# Patient Record
Sex: FEMALE | Race: WHITE | NOT HISPANIC OR LATINO | Employment: OTHER | URBAN - METROPOLITAN AREA
[De-identification: names, ages, dates, MRNs, and addresses within clinical notes are randomized per-mention and may not be internally consistent; named-entity substitution may affect disease eponyms.]

---

## 2017-07-19 ENCOUNTER — CONVERSION ENCOUNTER (OUTPATIENT)
Dept: GENERAL RADIOLOGY | Facility: HOSPITAL | Age: 47
End: 2017-07-19

## 2018-06-04 ENCOUNTER — OFFICE VISIT CONVERTED (OUTPATIENT)
Dept: NEUROSURGERY | Facility: CLINIC | Age: 48
End: 2018-06-04
Attending: PHYSICIAN ASSISTANT

## 2018-06-21 ENCOUNTER — OFFICE VISIT CONVERTED (OUTPATIENT)
Dept: NEUROLOGY | Facility: CLINIC | Age: 48
End: 2018-06-21
Attending: PSYCHIATRY & NEUROLOGY

## 2019-02-01 ENCOUNTER — HOSPITAL ENCOUNTER (OUTPATIENT)
Dept: PHYSICAL THERAPY | Facility: CLINIC | Age: 49
Discharge: HOME | End: 2019-02-01
Attending: EMERGENCY MEDICINE

## 2020-01-28 ENCOUNTER — HOSPITAL ENCOUNTER (OUTPATIENT)
Dept: GENERAL RADIOLOGY | Facility: HOSPITAL | Age: 50
Discharge: HOME | End: 2020-01-28
Attending: OBSTETRICS & GYNECOLOGY

## 2020-12-04 ENCOUNTER — TELEMEDICINE CONVERTED (OUTPATIENT)
Dept: FAMILY MEDICINE CLINIC | Facility: CLINIC | Age: 50
End: 2020-12-04
Attending: FAMILY MEDICINE

## 2020-12-10 ENCOUNTER — HOSPITAL ENCOUNTER (OUTPATIENT)
Dept: GENERAL RADIOLOGY | Facility: HOSPITAL | Age: 50
Discharge: HOME | End: 2020-12-10
Attending: FAMILY MEDICINE

## 2020-12-10 LAB
ALBUMIN SERPL-MCNC: 4 G/DL (ref 3.5–5)
ALBUMIN/GLOB SERPL: 1.3 {RATIO} (ref 1.4–2.6)
ALP SERPL-CCNC: 59 U/L (ref 42–98)
ALT SERPL-CCNC: 9 U/L (ref 10–40)
ANION GAP SERPL CALC-SCNC: 13 MMOL/L (ref 8–19)
AST SERPL-CCNC: 15 U/L (ref 15–50)
BASOPHILS # BLD AUTO: 0.02 10*3/UL (ref 0–0.2)
BASOPHILS NFR BLD AUTO: 0.4 % (ref 0–3)
BILIRUB SERPL-MCNC: 0.34 MG/DL (ref 0.2–1.3)
BUN SERPL-MCNC: 13 MG/DL (ref 5–25)
BUN/CREAT SERPL: 16 {RATIO} (ref 6–20)
CALCIUM SERPL-MCNC: 8.7 MG/DL (ref 8.7–10.4)
CHLORIDE SERPL-SCNC: 102 MMOL/L (ref 99–111)
CHOLEST SERPL-MCNC: 183 MG/DL (ref 107–200)
CHOLEST/HDLC SERPL: 2.5 {RATIO} (ref 3–6)
CONV ABS IMM GRAN: 0.01 10*3/UL (ref 0–0.2)
CONV CO2: 27 MMOL/L (ref 22–32)
CONV IMMATURE GRAN: 0.2 % (ref 0–1.8)
CONV TOTAL PROTEIN: 7 G/DL (ref 6.3–8.2)
CREAT UR-MCNC: 0.83 MG/DL (ref 0.5–0.9)
DEPRECATED RDW RBC AUTO: 37.9 FL (ref 36.4–46.3)
EOSINOPHIL # BLD AUTO: 0.06 10*3/UL (ref 0–0.7)
EOSINOPHIL # BLD AUTO: 1.2 % (ref 0–7)
ERYTHROCYTE [DISTWIDTH] IN BLOOD BY AUTOMATED COUNT: 11.8 % (ref 11.7–14.4)
FOLATE SERPL-MCNC: 8.6 NG/ML (ref 4.8–20)
GFR SERPLBLD BASED ON 1.73 SQ M-ARVRAT: >60 ML/MIN/{1.73_M2}
GLOBULIN UR ELPH-MCNC: 3 G/DL (ref 2–3.5)
GLUCOSE SERPL-MCNC: 86 MG/DL (ref 65–99)
HCT VFR BLD AUTO: 37.9 % (ref 37–47)
HDLC SERPL-MCNC: 74 MG/DL (ref 40–60)
HGB BLD-MCNC: 12.5 G/DL (ref 12–16)
LDLC SERPL CALC-MCNC: 87 MG/DL (ref 70–100)
LYMPHOCYTES # BLD AUTO: 1.48 10*3/UL (ref 1–5)
LYMPHOCYTES NFR BLD AUTO: 30.2 % (ref 20–45)
MCH RBC QN AUTO: 29.7 PG (ref 27–31)
MCHC RBC AUTO-ENTMCNC: 33 G/DL (ref 33–37)
MCV RBC AUTO: 90 FL (ref 81–99)
MONOCYTES # BLD AUTO: 0.32 10*3/UL (ref 0.2–1.2)
MONOCYTES NFR BLD AUTO: 6.5 % (ref 3–10)
NEUTROPHILS # BLD AUTO: 3.01 10*3/UL (ref 2–8)
NEUTROPHILS NFR BLD AUTO: 61.5 % (ref 30–85)
NRBC CBCN: 0 % (ref 0–0.7)
OSMOLALITY SERPL CALC.SUM OF ELEC: 285 MOSM/KG (ref 273–304)
PLATELET # BLD AUTO: 333 10*3/UL (ref 130–400)
PMV BLD AUTO: 11.4 FL (ref 9.4–12.3)
POTASSIUM SERPL-SCNC: 4.1 MMOL/L (ref 3.5–5.3)
RBC # BLD AUTO: 4.21 10*6/UL (ref 4.2–5.4)
SODIUM SERPL-SCNC: 138 MMOL/L (ref 135–147)
TRIGL SERPL-MCNC: 111 MG/DL (ref 40–150)
TSH SERPL-ACNC: 1.76 M[IU]/L (ref 0.27–4.2)
VIT B12 SERPL-MCNC: 419 PG/ML (ref 211–911)
VLDLC SERPL-MCNC: 22 MG/DL (ref 5–37)
WBC # BLD AUTO: 4.9 10*3/UL (ref 4.8–10.8)

## 2021-05-10 NOTE — H&P
History and Physical      Patient Name: Lucina Bustos   Patient ID: [id number]   Sex: Female   YOB: 1970    Primary Care Provider: Kaye NUNES   Referring Provider: Miladys Schreiber PA-C    Visit Date: December 4, 2020    Provider: Yunier Pryor DO   Location: [facility]   Location Address: [address] [place] [address]  [id number]   Location Phone: [phone]          Chief Complaint  · establish care, refills      History Of Present Illness  Video Conferencing Visit  Lucina Bustos is a 50 year old /White female who is presenting for evaluation via video conferencing via [employer]. Verbal consent obtained before beginning visit.   The following staff were present during this visit: Yunier Pryor DO   Lucina Bustos is a 50 year old /White female who presents for evaluation and treatment of:        Patient presents to establish care via telehealth to establish care has PMH negative for hypothyroidism and B12 deficiency recently scratched her eye and is going to follow-up with an eye doctor about that.    Otherwise doing well no concerns otherwise does need refills on her medicines and labs checked in the next few months or so       Past Medical History  Disease Name Date Onset Notes   Cervicalgia --  --    Hypothyroidism --  --    Lateral epicondylitis of right elbow 01/26/2017 --    Pain: Elbow --  --    Pain: Shoulder --  --          Past Surgical History  Procedure Name Date Notes   Colonoscopy --  David-2017   EGD --  David 2017   Hysterectomy --  --    Knee surgery --  --          Medication List  Name Date Started Instructions   cyanocobalamin (vitamin B-12) 1,000 mcg/mL injection solution 12/04/2020 inject 0.05 milliliter (50 mcg) by intramuscular route once a month for 365 days   ibuprofen 800 mg oral tablet  take 1 tablet by oral route 2 times a day   levothyroxine 100 mcg oral tablet 12/04/2020 take 1 tablet (100 mcg) by  oral route once daily for 90 days   NuLYTELY with Flavor Packs 420 gram oral recon soln 09/02/2020 take as directed   Premarin 0.625 mg oral tablet  --          Allergy List  Allergen Name Date Reaction Notes   NO KNOWN DRUG ALLERGIES --  --  --        Allergies Reconciled  Family Medical History  Disease Name Relative/Age Notes   Diabetes, unspecified type  --    Heart Attack (MI)  --    Family history of colon cancer Aunt/60   Mothers sister         Social History  Finding Status Start/Stop Quantity Notes   Smokeless tobacco Current every day 30/-- 1 can every 2d dip tobacco- 1 can last her 2 days   Tobacco Former 19/40 1ppd QUIT 12/5/2011         Review of Systems  · Constitutional  o Denies  o : fever, fatigue, weight loss, weight gain  · HENT  o Denies  o : sinus pain, nasal congestion, nasal discharge, postnasal drip  · Cardiovascular  o Denies  o : lower extremity edema, claudication, chest pressure, palpitations  · Respiratory  o Denies  o : shortness of breath, wheezing, cough, hemoptysis, dyspnea on exertion  · Gastrointestinal  o Denies  o : nausea, vomiting, diarrhea, constipation, abdominal pain  · Integument  o Denies  o : rash, itching  · Psychiatric  o Denies  o : anxiety, depression      Physical Examination  · Constitutional  o Appearance  o : no acute distress, well-nourished  · Head and Face  o Head  o :   § Inspection  § : atraumatic, normocephalic  · Ears, Nose, Mouth and Throat  o Ears  o : No pain, no loss of hearing, no tinnitus  o Nose  o : No discharge, no dryness, no bleeding and no obstruction, No discharge  · Respiratory  o Respiratory Effort  o : breathing comfortably, symmetric chest rise  · Skin and Subcutaneous Tissue  o General Inspection  o : no lesions present  · Psychiatric  o General  o : normal mood and affect          Assessment  · Hypothyroidism     244.9/E03.9  · B12 deficiency     266.2/E53.8         Hypothyroidism  *Control  Continue with Synthroid 100 mcg  Recheck in the  next 3 to 6 months    B12 deficiency  Continue with injection and sent home  We will recheck as well in the next 3 to 6 months    Follow-up annually for physical labs in the next 3 to 6 months sooner if concerns       Plan  · Orders  o Physical, Primary Care Panel (CBC, CMP, Lipid, TSH) [facility] ([id number], [id number], 80333, 85114) - 244.9/E03.9, 266.2/E53.8 - 12/04/2020  o ACO-39: Current medications updated and reviewed (, 1159F) - 244.9/E03.9, 266.2/E53.8 - 12/04/2020  o B12 Folate levels (B12FO) - 244.9/E03.9, 266.2/E53.8 - 12/04/2020  · Medications  o Medications have been Reconciled  · Instructions  o Patient was educated/instructed on their diagnosis, treatment and medications prior to discharge from the clinic today.  o Patient counseled to reduce calorie intake.  o Patient was instructed to exercise regularly.  o Patient instructed to seek medical attention urgently for new or worsening symptoms.  o Risks, benefits, and alternatives were discussed with the patient. The patient is aware of risks associated with:  o Chronic conditions reviewed and taken into consideration for today's treatment plan.  o Electronically Identified Patient Education Materials Provided Electronically  · Disposition  o Call or Return if symptoms worsen or persist.  o Labs before follow up ordered  o Follow up when due for next physical            Electronically Signed by: Yunier Pryor, DO -Author on December 4, 2020 09:53:53 AM

## 2021-05-16 VITALS — HEART RATE: 76 BPM | BODY MASS INDEX: 19.78 KG/M2 | HEIGHT: 67 IN | WEIGHT: 126 LBS

## 2021-05-22 ENCOUNTER — TRANSCRIBE ORDERS (OUTPATIENT)
Dept: ADMINISTRATIVE | Facility: HOSPITAL | Age: 51
End: 2021-05-22

## 2021-05-22 DIAGNOSIS — Z12.31 OTHER SCREENING MAMMOGRAM: Primary | ICD-10-CM

## 2021-07-02 ENCOUNTER — APPOINTMENT (OUTPATIENT)
Dept: MAMMOGRAPHY | Facility: HOSPITAL | Age: 51
End: 2021-07-02

## 2021-08-27 ENCOUNTER — OFFICE VISIT (OUTPATIENT)
Dept: FAMILY MEDICINE CLINIC | Facility: CLINIC | Age: 51
End: 2021-08-27

## 2021-08-27 VITALS
WEIGHT: 144.3 LBS | OXYGEN SATURATION: 96 % | TEMPERATURE: 98.6 F | BODY MASS INDEX: 22.65 KG/M2 | DIASTOLIC BLOOD PRESSURE: 76 MMHG | SYSTOLIC BLOOD PRESSURE: 123 MMHG | HEART RATE: 71 BPM | HEIGHT: 67 IN

## 2021-08-27 DIAGNOSIS — N95.1 PERIMENOPAUSAL SYMPTOMS: ICD-10-CM

## 2021-08-27 DIAGNOSIS — R53.82 CHRONIC FATIGUE: ICD-10-CM

## 2021-08-27 DIAGNOSIS — E53.8 B12 DEFICIENCY: Chronic | ICD-10-CM

## 2021-08-27 DIAGNOSIS — M54.2 NECK PAIN: ICD-10-CM

## 2021-08-27 DIAGNOSIS — D12.6 TUBULAR ADENOMA OF COLON: ICD-10-CM

## 2021-08-27 DIAGNOSIS — E03.9 HYPOTHYROIDISM, UNSPECIFIED TYPE: Primary | Chronic | ICD-10-CM

## 2021-08-27 PROCEDURE — 99213 OFFICE O/P EST LOW 20 MIN: CPT | Performed by: FAMILY MEDICINE

## 2021-08-27 RX ORDER — LEVOTHYROXINE SODIUM 0.1 MG/1
100 TABLET ORAL DAILY
COMMUNITY
Start: 2021-06-09 | End: 2021-09-09

## 2021-08-27 RX ORDER — CONJUGATED ESTROGENS 0.62 MG/1
0.62 TABLET, FILM COATED ORAL DAILY
COMMUNITY
Start: 2021-08-19 | End: 2021-10-18

## 2021-08-27 NOTE — PROGRESS NOTES
"Chief Complaint  Labs Only (b12/thyroid) and Fatigue    Subjective          Lucina Bustos presents to [facility]  History of Present Illness    Patient presents for follow up on chronic conditions, established care originally back 12/2020 via telehealth visit and needed labs rechecked and more formal follow up. Also complaining of chronic fatigue not improved by her thyroid medicine, she takes synthroid 100mcg daily, last checked 12/2020 and within normal limits, other labs CBC CMP lipids also great.     No recent illness, snoring weight gain stress out at home or work out of the norm. She also takes premarin for perimenopausal symptoms and helping, no depression.     She has some neck pain at times, no radiating signs or symptoms, could be tension will consider xray of neck if continues or worsens but advised stretching and therapy, prn aleve for relief.    Last mammogram 2019 or 2020 birads 2     Objective   Vital Signs:   /76   Pulse 71   Temp 98.6 °F (37 °C) (Temporal)   Ht 170.2 cm (67\")   Wt 65.5 kg (144 lb 4.8 oz)   SpO2 96%   BMI 22.60 kg/m²     Physical Exam  Vitals reviewed.   Constitutional:       Appearance: Normal appearance. She is well-developed.   HENT:      Head: Normocephalic and atraumatic.      Right Ear: External ear normal.      Left Ear: External ear normal.      Mouth/Throat:      Pharynx: No oropharyngeal exudate.   Eyes:      Conjunctiva/sclera: Conjunctivae normal.      Pupils: Pupils are equal, round, and reactive to light.   Cardiovascular:      Rate and Rhythm: Normal rate and regular rhythm.      Heart sounds: No murmur heard.   No friction rub. No gallop.    Pulmonary:      Effort: Pulmonary effort is normal.      Breath sounds: Normal breath sounds. No wheezing or rhonchi.   Abdominal:      General: Bowel sounds are normal. There is no distension.      Palpations: Abdomen is soft.      Tenderness: There is no abdominal tenderness. "   Skin:     General: Skin is warm and dry.   Neurological:      Mental Status: She is alert and oriented to person, place, and time.      Cranial Nerves: No cranial nerve deficit.   Psychiatric:         Mood and Affect: Mood and affect normal.         Behavior: Behavior normal.         Thought Content: Thought content normal.         Judgment: Judgment normal.        Result Review :   The following data was reviewed by: Yunier Pryor DO on 08/27/2021:  Common labs    Common Labsle 12/10/20   Glucose 86   BUN 13   Creatinine 0.83   Sodium 138   Potassium 4.1   Chloride 102   Calcium 8.7   Albumin 4.0   Total Bilirubin 0.34   Alkaline Phosphatase 59   AST (SGOT) 15   ALT (SGPT) 9 (A)   WBC 4.90   Hemoglobin 12.5   Hematocrit 37.9   Platelets 333   Total Cholesterol 183   Triglycerides 111   HDL Cholesterol 74 (A)   LDL Cholesterol  87   (A) Abnormal value       Comments are available for some flowsheets but are not being displayed.                     Assessment and Plan    Diagnoses and all orders for this visit:    1. Hypothyroidism, unspecified type (Primary)  -     TSH+Free T4; Future  -     Vitamin B12; Future    2. Chronic fatigue    3. Neck pain  -     TSH+Free T4; Future  -     Vitamin B12; Future    4. B12 deficiency  -     TSH+Free T4; Future  -     Vitamin B12; Future    5. Perimenopausal symptoms    6. Tubular adenoma of colon    Labs today to recheck TSH T4  long time treatment of hypothyroidism on synthroid 100mcg daily    Taking b12 daily as well recheck to see if needs to continue taking    Complaints of fatigue and neck pain, consider further workup if worsens no better, exercises, stress reduction advised     Cscope 2017 with tubular adenoma, repeat 3-5 years or per pathology report, mammogram repeat recoommended every 1-2 years, history of breast biopsy benign 2014      Follow Up   Return in about 6 months (around 2/27/2022), or if symptoms worsen or fail to improve, for Labs before, Annual  physical.  Patient was given instructions and counseling regarding her condition or for health maintenance advice. Please see specific information pulled into the AVS if appropriate.

## 2021-08-30 ENCOUNTER — LAB (OUTPATIENT)
Dept: LAB | Facility: HOSPITAL | Age: 51
End: 2021-08-30

## 2021-08-30 DIAGNOSIS — M54.2 NECK PAIN: ICD-10-CM

## 2021-08-30 DIAGNOSIS — E03.9 HYPOTHYROIDISM, UNSPECIFIED TYPE: Chronic | ICD-10-CM

## 2021-08-30 DIAGNOSIS — E53.8 B12 DEFICIENCY: Chronic | ICD-10-CM

## 2021-08-30 LAB
T4 FREE SERPL-MCNC: 1.52 NG/DL (ref 0.93–1.7)
TSH SERPL DL<=0.05 MIU/L-ACNC: 0.17 UIU/ML (ref 0.27–4.2)
VIT B12 BLD-MCNC: >2000 PG/ML (ref 211–946)

## 2021-08-30 PROCEDURE — 84439 ASSAY OF FREE THYROXINE: CPT

## 2021-08-30 PROCEDURE — 84443 ASSAY THYROID STIM HORMONE: CPT

## 2021-08-30 PROCEDURE — 36415 COLL VENOUS BLD VENIPUNCTURE: CPT

## 2021-08-30 PROCEDURE — 82607 VITAMIN B-12: CPT

## 2021-09-02 ENCOUNTER — TELEPHONE (OUTPATIENT)
Dept: FAMILY MEDICINE CLINIC | Facility: CLINIC | Age: 51
End: 2021-09-02

## 2021-09-02 NOTE — TELEPHONE ENCOUNTER
Caller: Lucina Bustos    Relationship: Self    Best call back number: [phone]    What test was performed: BLOOD DRAW, B12 AND THYROID     When was the test performed: 8/30/21    Where was the test performed: IN OFFICE    Additional notes:   REQUESTING INFORMATION ABOUT ANY NEED FOR MEDICATION CHANGE

## 2021-09-09 RX ORDER — LEVOTHYROXINE SODIUM 0.07 MG/1
75 TABLET ORAL DAILY
Qty: 30 TABLET | Refills: 5 | Status: SHIPPED | OUTPATIENT
Start: 2021-09-09 | End: 2022-03-07

## 2021-09-24 PROBLEM — R53.82 CHRONIC FATIGUE: Status: ACTIVE | Noted: 2021-09-24

## 2021-09-24 PROBLEM — E03.9 HYPOTHYROIDISM: Chronic | Status: ACTIVE | Noted: 2021-08-27

## 2021-09-24 PROBLEM — E53.8 B12 DEFICIENCY: Chronic | Status: ACTIVE | Noted: 2021-08-27

## 2021-09-24 PROBLEM — N95.1 PERIMENOPAUSAL SYMPTOMS: Status: ACTIVE | Noted: 2021-09-24

## 2021-09-24 PROBLEM — D12.6 TUBULAR ADENOMA OF COLON: Status: ACTIVE | Noted: 2017-05-01

## 2021-10-07 ENCOUNTER — TELEPHONE (OUTPATIENT)
Dept: OBSTETRICS AND GYNECOLOGY | Facility: CLINIC | Age: 51
End: 2021-10-07

## 2021-10-18 ENCOUNTER — OFFICE VISIT (OUTPATIENT)
Dept: OBSTETRICS AND GYNECOLOGY | Facility: CLINIC | Age: 51
End: 2021-10-18

## 2021-10-18 VITALS
HEIGHT: 68 IN | WEIGHT: 145 LBS | SYSTOLIC BLOOD PRESSURE: 145 MMHG | BODY MASS INDEX: 21.98 KG/M2 | HEART RATE: 90 BPM | DIASTOLIC BLOOD PRESSURE: 78 MMHG

## 2021-10-18 DIAGNOSIS — N95.1 MENOPAUSAL SYMPTOMS: Primary | ICD-10-CM

## 2021-10-18 PROBLEM — Z13.31 POSITIVE DEPRESSION SCREENING: Status: ACTIVE | Noted: 2021-10-18

## 2021-10-18 PROBLEM — R51.9 HEADACHE: Status: ACTIVE | Noted: 2021-08-27

## 2021-10-18 PROBLEM — E78.00 PURE HYPERCHOLESTEROLEMIA: Status: ACTIVE | Noted: 2021-10-18

## 2021-10-18 PROCEDURE — 99212 OFFICE O/P EST SF 10 MIN: CPT | Performed by: OBSTETRICS & GYNECOLOGY

## 2021-10-18 RX ORDER — ESTRADIOL 0.5 MG/1
0.5 TABLET ORAL DAILY
Qty: 90 TABLET | Refills: 4 | Status: SHIPPED | OUTPATIENT
Start: 2021-10-18 | End: 2022-10-19

## 2021-10-18 NOTE — PROGRESS NOTES
"GYN Visit    Chief Complaint   Patient presents with   • disc. Med     DISCUSIN SWITCHING HORMONE MEDS       HPI:   50 y.o. S/P TLH bilateral salpingectomies presents for medication change.  Pt has been on premarin 0.625 mg daily since her surgery.  She has had a good response to this treatment for her hot flashes.  She states she lost her job and now is without insurance.  She is paying full cash price for the premarin and it is costing $200.00 per month. She is interesting in a generic if possible. She has no other complaints.              History: PMHx, Meds, Allergies, PSHx, Social Hx, and POBHx all reviewed and updated.    PHYSICAL EXAM:  /78   Pulse 90   Ht 172.7 cm (68\")   Wt 65.8 kg (145 lb)   BMI 22.05 kg/m²   General- NAD, alert and oriented, appropriate  Psych- Normal mood, good memory  Ext- No edema, no cyanosis    Skin- No lesions, no rashes, no acanthosis nigricans        ASSESSMENT AND PLAN:  Diagnoses and all orders for this visit:    1. Menopausal symptoms (Primary)  -     estradiol (ESTRACE) 0.5 MG tablet; Take 1 tablet by mouth Daily.  Dispense: 90 tablet; Refill: 4  2. Premarin 0.625 mg samples #25              Follow Up:  No follow-ups on file.          Ce Man DO  10/18/2021    [facility]  [address] [place] [address]  Dept: [phone]  Dept Fax: [phone]  Loc: [phone]  Loc Fax: [phone]     "

## 2022-01-04 ENCOUNTER — OFFICE VISIT (OUTPATIENT)
Dept: OBSTETRICS AND GYNECOLOGY | Facility: CLINIC | Age: 52
End: 2022-01-04

## 2022-01-04 VITALS
HEIGHT: 67 IN | HEART RATE: 75 BPM | SYSTOLIC BLOOD PRESSURE: 135 MMHG | WEIGHT: 153.4 LBS | BODY MASS INDEX: 24.08 KG/M2 | DIASTOLIC BLOOD PRESSURE: 75 MMHG

## 2022-01-04 DIAGNOSIS — Z01.411 ENCOUNTER FOR GYNECOLOGICAL EXAMINATION WITH ABNORMAL FINDING: Primary | ICD-10-CM

## 2022-01-04 DIAGNOSIS — N76.0 RECURRENT VAGINITIS: ICD-10-CM

## 2022-01-04 LAB
C TRACH RRNA CVX QL NAA+PROBE: NOT DETECTED
CANDIDA SPECIES: NEGATIVE
DEVELOPER EXPIRATION DATE: NORMAL
DEVELOPER LOT NUMBER: NORMAL
EXPIRATION DATE: NORMAL
FECAL OCCULT BLOOD SCREEN, POC: NEGATIVE
GARDNERELLA VAGINALIS: NEGATIVE
Lab: NORMAL
N GONORRHOEA RRNA SPEC QL NAA+PROBE: NOT DETECTED
NEGATIVE CONTROL: NEGATIVE
POSITIVE CONTROL: POSITIVE
T VAGINALIS DNA VAG QL PROBE+SIG AMP: NEGATIVE

## 2022-01-04 PROCEDURE — 99396 PREV VISIT EST AGE 40-64: CPT | Performed by: OBSTETRICS & GYNECOLOGY

## 2022-01-04 PROCEDURE — 87660 TRICHOMONAS VAGIN DIR PROBE: CPT | Performed by: OBSTETRICS & GYNECOLOGY

## 2022-01-04 PROCEDURE — 99213 OFFICE O/P EST LOW 20 MIN: CPT | Performed by: OBSTETRICS & GYNECOLOGY

## 2022-01-04 PROCEDURE — 87480 CANDIDA DNA DIR PROBE: CPT | Performed by: OBSTETRICS & GYNECOLOGY

## 2022-01-04 PROCEDURE — 87491 CHLMYD TRACH DNA AMP PROBE: CPT | Performed by: OBSTETRICS & GYNECOLOGY

## 2022-01-04 PROCEDURE — 87510 GARDNER VAG DNA DIR PROBE: CPT | Performed by: OBSTETRICS & GYNECOLOGY

## 2022-01-04 PROCEDURE — 82274 ASSAY TEST FOR BLOOD FECAL: CPT | Performed by: OBSTETRICS & GYNECOLOGY

## 2022-01-04 PROCEDURE — 87081 CULTURE SCREEN ONLY: CPT | Performed by: OBSTETRICS & GYNECOLOGY

## 2022-01-04 PROCEDURE — 87109 MYCOPLASMA: CPT | Performed by: OBSTETRICS & GYNECOLOGY

## 2022-01-04 PROCEDURE — 87591 N.GONORRHOEAE DNA AMP PROB: CPT | Performed by: OBSTETRICS & GYNECOLOGY

## 2022-01-04 NOTE — PROGRESS NOTES
"Well Woman Visit    CC: Annual well woman exam       HPI:   51 y.o. Contraception or HRT: using HRT, happy with it  Menses:  none  Pain:  None  Incontinence concerns: No  Hx of abnormal pap:  No  Pt has concerns she would like to discuss. c/o ongoing vaginal odor. occ itching. occ white vaginal d/c.      History: PMHx, Meds, Allergies, PSHx, Social Hx, and POBHx all reviewed and updated.      PHYSICAL EXAM:  /75   Pulse 75   Ht 170.2 cm (67\")   Wt 69.6 kg (153 lb 6.4 oz)   BMI 24.03 kg/m²   General- NAD, alert and oriented, appropriate  Psych- Normal mood, good memory  Neck- No masses, no thyroid enlargement  CV- Regular rhythm, no murnurs  Resp- CTA to bases, no wheezes  Abdomen- Soft, non distended, non tender, no masses    Breast left-  Bilaterally symmetrical, no masses, non tender, no nipple discharge  Breast right- Bilaterally symmetrical, no masses, non tender, no nipple discharge    External genitalia- Normal female, no lesions  Urethra/meatus- Normal, no masses, non tender, no prolapse  Bladder- Normal, no masses, non tender, no prolapse  Vagina- No atrophy, no lesions, scant white discharge, no prolapse  Cvx- Absent  Uterus- Absent  Adnexa- No mass, non tender  Anus/Rectum/Perineum- Small hemorrhoids, non thrombosed, Hemoccult neg    Lymphatic- No palpable neck, axillary, or groin nodes  Ext- No edema, no cyanosis    Skin- No lesions, no rashes, no acanthosis nigricans        ASSESSMENT and PLAN:  WWE and Problem Visit    Diagnoses and all orders for this visit:    1. Encounter for gynecological examination with abnormal finding (Primary)  -     Mammo Screening Digital Tomosynthesis Bilateral With CAD; Future    2. Recurrent vaginitis  -     Chlamydia trachomatis, Neisseria gonorrhoeae, PCR - Swab, Cervix  -     Gardnerella vaginalis, Trichomonas vaginalis, Candida albicans, DNA - Swab, Vagina  -     Group B Streptococcus Culture - Swab, Vagina  -     Mycoplasma / Ureaplasma Culture - " Swab, Cervix        Counseling:     OCP/Hormone use risk HUNTER    Domestic violence/abuse screen: negative    Depression screen: no SI    Preventative:   BREAST HEALTH- Monthly self breast exam importance and how to reviewed. MMG and/or MRI (prn) reviewed per society guidelines and her individual history. Mammo/MRI screen: Updated today.  CERVICAL CANCER Screening- Reviewed current ASCCP guidelines for screening w and wo cotest HPV, age specific.  Screen: Not medically needed.  COLON CANCER Screening- Reviewed current medical society guidelines and options.  Colonoscopy screen:  Already up to date.  SEXUAL HEALTH: desires std swabs.  VACCINATIONS Recommended: Flu annually.  Importance discussed, risk being unvaccinated reviewed.  Questions answered  Smoking status- NON SMOKER.  Importance of avoiding second hand smoke.  Follow up PCP/Specialist PMHx and Labs  Myriad: Does not qualify.      She understands the importance of having any ordered tests to be performed in a timely fashion.  She is encouraged to review her results online and/or contact or office if she has questions.     Follow Up:  Return if symptoms worsen or fail to improve.      Sidra Kendall, APRN  01/04/2022

## 2022-01-06 LAB — BACTERIA SPEC AEROBE CULT: NORMAL

## 2022-01-11 LAB
M HOMINIS SPEC QL CULT: NEGATIVE
U UREALYTICUM SPEC QL CULT: NEGATIVE

## 2022-02-15 ENCOUNTER — OFFICE VISIT (OUTPATIENT)
Dept: OBSTETRICS AND GYNECOLOGY | Facility: CLINIC | Age: 52
End: 2022-02-15

## 2022-02-15 VITALS
WEIGHT: 149 LBS | BODY MASS INDEX: 23.39 KG/M2 | HEIGHT: 67 IN | DIASTOLIC BLOOD PRESSURE: 87 MMHG | HEART RATE: 83 BPM | SYSTOLIC BLOOD PRESSURE: 142 MMHG

## 2022-02-15 DIAGNOSIS — N76.0 RECURRENT VAGINITIS: Primary | ICD-10-CM

## 2022-02-15 PROCEDURE — 99213 OFFICE O/P EST LOW 20 MIN: CPT | Performed by: OBSTETRICS & GYNECOLOGY

## 2022-02-15 RX ORDER — CLINDAMYCIN HYDROCHLORIDE 300 MG/1
300 CAPSULE ORAL 2 TIMES DAILY
Qty: 14 CAPSULE | Refills: 0 | Status: SHIPPED | OUTPATIENT
Start: 2022-02-15 | End: 2022-02-22

## 2022-02-15 RX ORDER — FLUCONAZOLE 150 MG/1
150 TABLET ORAL
Qty: 2 TABLET | Refills: 0 | Status: SHIPPED | OUTPATIENT
Start: 2022-02-15 | End: 2022-02-19

## 2022-02-15 RX ORDER — METRONIDAZOLE 7.5 MG/G
GEL VAGINAL
Qty: 70 G | Refills: 1 | Status: SHIPPED | OUTPATIENT
Start: 2022-02-15 | End: 2022-04-06

## 2022-02-15 NOTE — PROGRESS NOTES
"GYN Problem/Follow Up Visit    Chief Complaint   Patient presents with   • Vaginal Discharge           HPI  Lucina Bustos is a 51 y.o. female, , who presents for recurrent vaginitis. Swabs were negative last month but she has continued to have vaginal odor and d/c. Taking oral estrogen and also using vaginal estrogen twice weekly. Denies vb. Taking daily probiotic.       Additional OB/GYN History   No LMP recorded. Patient has had a hysterectomy.  Allergies : Patient has no known allergies.     The additional following portions of the patient's history were reviewed and updated as appropriate: allergies, current medications, past family history, past medical history, past social history, past surgical history and problem list.    Review of Systems    I have reviewed and agree with the HPI, ROS, and historical information as entered above. Sidra Kendall, APRN    Objective   /87   Pulse 83   Ht 170.2 cm (67\")   Wt 67.6 kg (149 lb)   BMI 23.34 kg/m²     Physical Exam  Vitals reviewed.   Genitourinary:     General: Normal vulva.      Vagina: Normal.      Comments: Cervix and uterus surg absent.  Skin:     General: Skin is warm and dry.   Neurological:      Mental Status: She is alert and oriented to person, place, and time.            Assessment and Plan    Diagnoses and all orders for this visit:    1. Recurrent vaginitis (Primary)  -     clindamycin (Cleocin) 300 MG capsule; Take 1 capsule by mouth 2 (Two) Times a Day for 7 days.  Dispense: 14 capsule; Refill: 0  -     fluconazole (Diflucan) 150 MG tablet; Take 1 tablet by mouth Every 72 (Seventy-Two) Hours for 2 doses.  Dispense: 2 tablet; Refill: 0  -     metroNIDAZOLE (METROGEL VAGINAL) 0.75 % vaginal gel; Insert  into the vagina Every 7 (Seven) Days for 8 doses.  Dispense: 70 g; Refill: 1    will treat acutely with diflucan and clinda and then switch to weekly dose of metrogel. Continue probiotics and estrogen.     Counseling:  She understands the " importance of having the above orders performed in a timely fashion.  She is encouraged to review her results online and/or contact or office if she has questions.     Follow Up:  Return in about 8 weeks (around 4/12/2022) for med f/u-phone appt román.      Sidra Kendall, APRN  02/15/2022

## 2022-02-25 ENCOUNTER — HOSPITAL ENCOUNTER (OUTPATIENT)
Dept: MAMMOGRAPHY | Facility: HOSPITAL | Age: 52
Discharge: HOME | End: 2022-02-25
Admitting: OBSTETRICS & GYNECOLOGY

## 2022-02-25 DIAGNOSIS — Z01.411 ENCOUNTER FOR GYNECOLOGICAL EXAMINATION WITH ABNORMAL FINDING: ICD-10-CM

## 2022-02-25 PROCEDURE — 77067 SCR MAMMO BI INCL CAD: CPT

## 2022-02-25 PROCEDURE — 77063 BREAST TOMOSYNTHESIS BI: CPT

## 2022-03-07 DIAGNOSIS — E03.9 HYPOTHYROIDISM, UNSPECIFIED TYPE: Primary | ICD-10-CM

## 2022-03-07 DIAGNOSIS — E53.8 B12 DEFICIENCY: ICD-10-CM

## 2022-03-07 DIAGNOSIS — R53.82 CHRONIC FATIGUE: ICD-10-CM

## 2022-03-07 RX ORDER — LEVOTHYROXINE SODIUM 0.07 MG/1
TABLET ORAL
Qty: 90 TABLET | Refills: 1 | Status: SHIPPED | OUTPATIENT
Start: 2022-03-07 | End: 2022-09-15

## 2022-04-12 ENCOUNTER — OFFICE VISIT (OUTPATIENT)
Dept: OBSTETRICS AND GYNECOLOGY | Facility: CLINIC | Age: 52
End: 2022-04-12

## 2022-04-12 DIAGNOSIS — N76.0 RECURRENT VAGINITIS: Primary | ICD-10-CM

## 2022-04-12 PROCEDURE — 99442 PR PHYS/QHP TELEPHONE EVALUATION 11-20 MIN: CPT | Performed by: OBSTETRICS & GYNECOLOGY

## 2022-04-12 RX ORDER — CLINDAMYCIN PHOSPHATE 20 MG/G
1 CREAM VAGINAL NIGHTLY
Qty: 7 G | Refills: 0 | Status: SHIPPED | OUTPATIENT
Start: 2022-04-12 | End: 2022-04-19

## 2022-04-12 NOTE — PROGRESS NOTES
GYN Problem/Follow Up Visit    Chief Complaint   Patient presents with   • Follow-up     meds      You have chosen to receive care through a telephone visit. Do you consent to use a telephone visit for your medical care today? Yes     HPI  Lucina Bustos is a 51 y.o. female, , who presents for med f/u. Seen in office 2/15/22 for recurrent vaginitis. Had multiple swabs collected which were negative. Treated with clinda and diflucan and pt states sx are much better now. Has also been using weekly metrogel. Stopped her vaginal estrogen while she has been using the metrogel. States only notices a slight odor at times now.        Additional OB/GYN History   No LMP recorded. Patient has had a hysterectomy.  Allergies : Patient has no known allergies.     The additional following portions of the patient's history were reviewed and updated as appropriate: allergies, current medications, past family history, past medical history, past social history, past surgical history and problem list.    Review of Systems    I have reviewed and agree with the HPI, ROS, and historical information as entered above. DES Stein    Objective   There were no vitals taken for this visit.    Physical Exam  Neurological:      Mental Status: She is alert and oriented to person, place, and time.            Assessment and Plan    Diagnoses and all orders for this visit:    1. Recurrent vaginitis (Primary)  -     clindamycin (CLEOCIN) 2 % vaginal cream; Insert 1 applicator into the vagina Every Night for 7 days.  Dispense: 7 g; Refill: 0    resume vaginal estrogen twice weekly. Also discussed sending in vaginal clindamycin to take if sx return since clindamycin worked well for her. She also reported hx of painful intercourse and if her sx persist after consistent vaginal estrogen use she may benefit from dilator therapy.    Counseling:  She understands the importance of having the above orders performed in a timely fashion.  She is  encouraged to review her results online and/or contact or office if she has questions.     Follow Up:  Return if symptoms worsen or fail to improve.    This visit has been rescheduled as a phone visit to comply with patient safety concerns in accordance with CDC recommendations. Total time of discussion was 15 minutes.      DES Stein  04/12/2022

## 2022-04-13 RX ORDER — ESTRADIOL 0.1 MG/G
CREAM VAGINAL
Qty: 42.5 G | Refills: 2 | OUTPATIENT
Start: 2022-04-13

## 2022-05-16 ENCOUNTER — OFFICE VISIT (OUTPATIENT)
Dept: FAMILY MEDICINE CLINIC | Facility: CLINIC | Age: 52
End: 2022-05-16

## 2022-05-16 ENCOUNTER — LAB (OUTPATIENT)
Dept: LAB | Facility: HOSPITAL | Age: 52
End: 2022-05-16

## 2022-05-16 VITALS
HEIGHT: 67 IN | OXYGEN SATURATION: 100 % | HEART RATE: 87 BPM | SYSTOLIC BLOOD PRESSURE: 150 MMHG | DIASTOLIC BLOOD PRESSURE: 74 MMHG | WEIGHT: 145.8 LBS | TEMPERATURE: 98.2 F | RESPIRATION RATE: 18 BRPM | BODY MASS INDEX: 22.88 KG/M2

## 2022-05-16 DIAGNOSIS — E03.9 HYPOTHYROIDISM, UNSPECIFIED TYPE: Chronic | ICD-10-CM

## 2022-05-16 DIAGNOSIS — H92.09 OTALGIA, UNSPECIFIED LATERALITY: ICD-10-CM

## 2022-05-16 DIAGNOSIS — J30.1 SEASONAL ALLERGIC RHINITIS DUE TO POLLEN: ICD-10-CM

## 2022-05-16 DIAGNOSIS — E53.8 B12 DEFICIENCY: Chronic | ICD-10-CM

## 2022-05-16 DIAGNOSIS — E03.9 HYPOTHYROIDISM, UNSPECIFIED TYPE: ICD-10-CM

## 2022-05-16 DIAGNOSIS — E53.8 B12 DEFICIENCY: ICD-10-CM

## 2022-05-16 DIAGNOSIS — R53.82 CHRONIC FATIGUE: Chronic | ICD-10-CM

## 2022-05-16 DIAGNOSIS — R53.82 CHRONIC FATIGUE: ICD-10-CM

## 2022-05-16 DIAGNOSIS — J01.10 ACUTE NON-RECURRENT FRONTAL SINUSITIS: Primary | ICD-10-CM

## 2022-05-16 PROBLEM — J43.1 PANLOBULAR EMPHYSEMA (HCC): Status: ACTIVE | Noted: 2022-05-16

## 2022-05-16 PROBLEM — D72.810 LYMPHOCYTOPENIA: Status: ACTIVE | Noted: 2022-05-16

## 2022-05-16 LAB
ALBUMIN SERPL-MCNC: 4.2 G/DL (ref 3.5–5.2)
ALBUMIN/GLOB SERPL: 1.2 G/DL
ALP SERPL-CCNC: 60 U/L (ref 39–117)
ALT SERPL W P-5'-P-CCNC: 20 U/L (ref 1–33)
ANION GAP SERPL CALCULATED.3IONS-SCNC: 10.9 MMOL/L (ref 5–15)
AST SERPL-CCNC: 24 U/L (ref 1–32)
BILIRUB SERPL-MCNC: 0.5 MG/DL (ref 0–1.2)
BUN SERPL-MCNC: 14 MG/DL (ref 6–20)
BUN/CREAT SERPL: 17.9 (ref 7–25)
CALCIUM SPEC-SCNC: 9.5 MG/DL (ref 8.6–10.5)
CHLORIDE SERPL-SCNC: 101 MMOL/L (ref 98–107)
CHOLEST SERPL-MCNC: 199 MG/DL (ref 0–200)
CO2 SERPL-SCNC: 25.1 MMOL/L (ref 22–29)
CREAT SERPL-MCNC: 0.78 MG/DL (ref 0.57–1)
DEPRECATED RDW RBC AUTO: 38.4 FL (ref 37–54)
EGFRCR SERPLBLD CKD-EPI 2021: 93.2 ML/MIN/1.73
ERYTHROCYTE [DISTWIDTH] IN BLOOD BY AUTOMATED COUNT: 12.1 % (ref 12.3–15.4)
FOLATE SERPL-MCNC: 9.55 NG/ML (ref 4.78–24.2)
GLOBULIN UR ELPH-MCNC: 3.6 GM/DL
GLUCOSE SERPL-MCNC: 82 MG/DL (ref 65–99)
HCT VFR BLD AUTO: 39 % (ref 34–46.6)
HDLC SERPL-MCNC: 71 MG/DL (ref 40–60)
HGB BLD-MCNC: 13.3 G/DL (ref 12–15.9)
IRON 24H UR-MRATE: 65 MCG/DL (ref 37–145)
IRON SATN MFR SERPL: 18 % (ref 20–50)
LDLC SERPL CALC-MCNC: 121 MG/DL (ref 0–100)
LDLC/HDLC SERPL: 1.7 {RATIO}
MCH RBC QN AUTO: 30 PG (ref 26.6–33)
MCHC RBC AUTO-ENTMCNC: 34.1 G/DL (ref 31.5–35.7)
MCV RBC AUTO: 87.8 FL (ref 79–97)
PLATELET # BLD AUTO: 266 10*3/MM3 (ref 140–450)
PMV BLD AUTO: 10.3 FL (ref 6–12)
POTASSIUM SERPL-SCNC: 4.3 MMOL/L (ref 3.5–5.2)
PROT SERPL-MCNC: 7.8 G/DL (ref 6–8.5)
RBC # BLD AUTO: 4.44 10*6/MM3 (ref 3.77–5.28)
SODIUM SERPL-SCNC: 137 MMOL/L (ref 136–145)
T4 FREE SERPL-MCNC: 1.29 NG/DL (ref 0.93–1.7)
TIBC SERPL-MCNC: 361 MCG/DL (ref 298–536)
TRANSFERRIN SERPL-MCNC: 242 MG/DL (ref 200–360)
TRIGL SERPL-MCNC: 38 MG/DL (ref 0–150)
TSH SERPL DL<=0.05 MIU/L-ACNC: 4.58 UIU/ML (ref 0.27–4.2)
VIT B12 BLD-MCNC: 851 PG/ML (ref 211–946)
VLDLC SERPL-MCNC: 7 MG/DL (ref 5–40)
WBC NRBC COR # BLD: 4.32 10*3/MM3 (ref 3.4–10.8)

## 2022-05-16 PROCEDURE — 80050 GENERAL HEALTH PANEL: CPT

## 2022-05-16 PROCEDURE — 82746 ASSAY OF FOLIC ACID SERUM: CPT

## 2022-05-16 PROCEDURE — 83540 ASSAY OF IRON: CPT

## 2022-05-16 PROCEDURE — 36415 COLL VENOUS BLD VENIPUNCTURE: CPT

## 2022-05-16 PROCEDURE — 99214 OFFICE O/P EST MOD 30 MIN: CPT | Performed by: FAMILY MEDICINE

## 2022-05-16 PROCEDURE — 80061 LIPID PANEL: CPT

## 2022-05-16 PROCEDURE — 84466 ASSAY OF TRANSFERRIN: CPT

## 2022-05-16 PROCEDURE — 82607 VITAMIN B-12: CPT

## 2022-05-16 PROCEDURE — 84439 ASSAY OF FREE THYROXINE: CPT

## 2022-05-16 RX ORDER — HYDROCODONE BITARTRATE AND ACETAMINOPHEN 7.5; 325 MG/1; MG/1
1 TABLET ORAL EVERY 6 HOURS PRN
COMMUNITY
Start: 2022-04-21 | End: 2022-05-16

## 2022-05-16 RX ORDER — AMOXICILLIN 500 MG/1
1000 CAPSULE ORAL 2 TIMES DAILY
Qty: 28 CAPSULE | Refills: 0 | Status: SHIPPED | OUTPATIENT
Start: 2022-05-16 | End: 2022-05-23

## 2022-05-16 RX ORDER — LORATADINE 10 MG/1
10 TABLET ORAL DAILY
Qty: 30 TABLET | Refills: 0 | Status: SHIPPED | OUTPATIENT
Start: 2022-05-16 | End: 2022-06-08

## 2022-05-16 NOTE — PROGRESS NOTES
Chief Complaint  Follow-up (6 Month follow up, would like thyroid checked), Earache (Had abcess tooth and pulled about a month ago), and Annual Exam    Subjective          Lucina Bustos presents to [facility]  History of Present Illness  The patient presents to the clinic with a history of hypothyroidism and is taking 75 mcg of Synthroid. She is on estradiol 0.5 mg daily, as well. She has hypothyroidism, pure hypercholesterolemia, B12 deficiency, and chronic fatigue. She is requesting her thyroid levels rechecked today as well as her ears. Recently she had an abscessed tooth and an infection, which resulted in an extraction. She is finishing antibiotics but, she has some pain in her ears bilaterally. Her blood pressure today 150/74 mmHg and are typically in the range of 142 to150 systolic and 74 to 87 diastolic in the past.    She reports experiencing exhaustion the previous few months and believes it may be her thyroid.    She notes 1 month ago, he tooth was infected resulting in extraction. She states she has a knot in her gums, which remains and there is still soreness in the knot; however she denies pain in the area. She notes she feels as if something is in her ears moving and adds they itch and have some pain. She reports she completed 7 days of antibiotics prior to her tooth extraction. She reports there was some infection remaining the day her tooth was extracted, which was discovered with an x-ray. She notes notifying her dentist; however, they were only f=going to call in an allergy medication. She states she has an appointment nest month.    This morning, when the patient woke, she felt a burning in her nose when she took a breath. Additionally, she felt drainage in the back of her throat and in the back of her chest. She denies taking allergy medication.    Objective   Vital Signs:  /74 (BP Location: Left arm, Patient Position: Sitting)   Pulse 87   Temp 98.2 °F  "(36.8 °C) (Temporal)   Resp 18   Ht 170.2 cm (67\")   Wt 66.1 kg (145 lb 12.8 oz)   SpO2 100%   BMI 22.84 kg/m²     BMI is within normal parameters. No follow-up required.      Physical Exam  Vitals reviewed.   Constitutional:       Appearance: Normal appearance. She is well-developed.   HENT:      Head: Normocephalic and atraumatic.      Right Ear: External ear normal.      Left Ear: External ear normal.      Nose: Nose normal.   Eyes:      Conjunctiva/sclera: Conjunctivae normal.      Pupils: Pupils are equal, round, and reactive to light.   Cardiovascular:      Rate and Rhythm: Normal rate.   Pulmonary:      Effort: Pulmonary effort is normal.      Breath sounds: Normal breath sounds.   Abdominal:      General: There is no distension.   Skin:     General: Skin is warm and dry.   Neurological:      General: No focal deficit present.      Mental Status: She is alert and oriented to person, place, and time.   Psychiatric:         Mood and Affect: Mood and affect normal.         Behavior: Behavior normal.         Thought Content: Thought content normal.         Judgment: Judgment normal.        Result Review :   The following data was reviewed by: Yunier Pryor DO on 05/16/2022:                Assessment and Plan    Diagnoses and all orders for this visit:    1. Acute non-recurrent frontal sinusitis (Primary)        -  Antibiotic and allergy medicine are prescribed.     2. Hypothyroidism, unspecified type        -  She is to continue with her current medication as prescribed. I will recheck her thyroid levels, iron profile, and other labs.    3. Seasonal allergic rhinitis due to pollen   -  Antibiotic and allergy medicine are prescribed.     4. Chronic fatigue        -  She is to continue with her current medication as prescribed. I will recheck her thyroid levels, iron profile, and other labs.    5. B12 deficiency    6. Otalgia, unspecified laterality    Other orders  -     loratadine (Claritin) 10 MG tablet; " Take 1 tablet by mouth Daily.  Dispense: 30 tablet; Refill: 0  -     amoxicillin (AMOXIL) 500 MG capsule; Take 2 capsules by mouth 2 (Two) Times a Day for 7 days.  Dispense: 28 capsule; Refill: 0    Follow-up is every 6 months.         Follow Up   Return in about 6 months (around 11/16/2022), or if symptoms worsen or fail to improve, for Labs before.  Patient was given instructions and counseling regarding her condition or for health maintenance advice. Please see specific information pulled into the AVS if appropriate.       Answers for HPI/ROS submitted by the patient on 5/14/2022  Please describe your symptoms.: Thyroid blood work  Have you had these symptoms before?: Yes  How long have you been having these symptoms?: Greater than 2 weeks  Please list any medications you are currently taking for this condition.: Levothyroxine  What is the primary reason for your visit?: Other    Transcribed from ambient dictation for Yunier Pryor DO by Miguel Espinosa.  05/16/22   10:33 EDT    Patient verbalized consent to the visit recording.

## 2022-06-08 RX ORDER — LORATADINE 10 MG/1
TABLET ORAL
Qty: 30 TABLET | Refills: 0 | Status: SHIPPED | OUTPATIENT
Start: 2022-06-08 | End: 2022-11-18

## 2022-09-15 RX ORDER — LEVOTHYROXINE SODIUM 0.07 MG/1
TABLET ORAL
Qty: 90 TABLET | Refills: 0 | Status: SHIPPED | OUTPATIENT
Start: 2022-09-15 | End: 2022-12-09

## 2022-10-19 DIAGNOSIS — N95.1 MENOPAUSAL SYMPTOMS: ICD-10-CM

## 2022-10-19 RX ORDER — ESTRADIOL 0.5 MG/1
TABLET ORAL
Qty: 90 TABLET | Refills: 0 | Status: SHIPPED | OUTPATIENT
Start: 2022-10-19 | End: 2023-01-23

## 2022-11-04 ENCOUNTER — TELEPHONE (OUTPATIENT)
Dept: FAMILY MEDICINE CLINIC | Facility: CLINIC | Age: 52
End: 2022-11-04

## 2022-11-04 NOTE — TELEPHONE ENCOUNTER
PT states she has been having heart flutters and headache so I advised emergency room and follow up with us afterwards.pt voiced understanding.

## 2022-12-09 RX ORDER — LEVOTHYROXINE SODIUM 0.07 MG/1
TABLET ORAL
Qty: 90 TABLET | Refills: 0 | Status: SHIPPED | OUTPATIENT
Start: 2022-12-09 | End: 2023-03-10

## 2023-01-17 ENCOUNTER — LAB (OUTPATIENT)
Dept: LAB | Facility: HOSPITAL | Age: 53
End: 2023-01-17
Payer: COMMERCIAL

## 2023-01-17 DIAGNOSIS — E53.8 B12 DEFICIENCY: ICD-10-CM

## 2023-01-17 DIAGNOSIS — R53.82 CHRONIC FATIGUE: ICD-10-CM

## 2023-01-17 DIAGNOSIS — E03.9 HYPOTHYROIDISM, UNSPECIFIED TYPE: ICD-10-CM

## 2023-01-17 LAB
ALBUMIN SERPL-MCNC: 4.3 G/DL (ref 3.5–5.2)
ALBUMIN/GLOB SERPL: 1.3 G/DL
ALP SERPL-CCNC: 65 U/L (ref 39–117)
ALT SERPL W P-5'-P-CCNC: 9 U/L (ref 1–33)
ANION GAP SERPL CALCULATED.3IONS-SCNC: 11 MMOL/L (ref 5–15)
AST SERPL-CCNC: 18 U/L (ref 1–32)
BILIRUB SERPL-MCNC: 0.4 MG/DL (ref 0–1.2)
BUN SERPL-MCNC: 13 MG/DL (ref 6–20)
BUN/CREAT SERPL: 15.1 (ref 7–25)
CALCIUM SPEC-SCNC: 9.4 MG/DL (ref 8.6–10.5)
CHLORIDE SERPL-SCNC: 104 MMOL/L (ref 98–107)
CHOLEST SERPL-MCNC: 185 MG/DL (ref 0–200)
CO2 SERPL-SCNC: 26 MMOL/L (ref 22–29)
CREAT SERPL-MCNC: 0.86 MG/DL (ref 0.57–1)
DEPRECATED RDW RBC AUTO: 40.6 FL (ref 37–54)
EGFRCR SERPLBLD CKD-EPI 2021: 82.4 ML/MIN/1.73
ERYTHROCYTE [DISTWIDTH] IN BLOOD BY AUTOMATED COUNT: 12.6 % (ref 12.3–15.4)
FOLATE SERPL-MCNC: >20 NG/ML (ref 4.78–24.2)
GLOBULIN UR ELPH-MCNC: 3.3 GM/DL
GLUCOSE SERPL-MCNC: 95 MG/DL (ref 65–99)
HCT VFR BLD AUTO: 41.5 % (ref 34–46.6)
HDLC SERPL-MCNC: 63 MG/DL (ref 40–60)
HGB BLD-MCNC: 13.8 G/DL (ref 12–15.9)
IRON 24H UR-MRATE: 162 MCG/DL (ref 37–145)
IRON SATN MFR SERPL: 41 % (ref 20–50)
LDLC SERPL CALC-MCNC: 112 MG/DL (ref 0–100)
LDLC/HDLC SERPL: 1.78 {RATIO}
MCH RBC QN AUTO: 29.2 PG (ref 26.6–33)
MCHC RBC AUTO-ENTMCNC: 33.3 G/DL (ref 31.5–35.7)
MCV RBC AUTO: 87.7 FL (ref 79–97)
PLATELET # BLD AUTO: 338 10*3/MM3 (ref 140–450)
PMV BLD AUTO: 10.5 FL (ref 6–12)
POTASSIUM SERPL-SCNC: 4.4 MMOL/L (ref 3.5–5.2)
PROT SERPL-MCNC: 7.6 G/DL (ref 6–8.5)
RBC # BLD AUTO: 4.73 10*6/MM3 (ref 3.77–5.28)
SODIUM SERPL-SCNC: 141 MMOL/L (ref 136–145)
T4 FREE SERPL-MCNC: 1.48 NG/DL (ref 0.93–1.7)
TIBC SERPL-MCNC: 392 MCG/DL (ref 298–536)
TRANSFERRIN SERPL-MCNC: 263 MG/DL (ref 200–360)
TRIGL SERPL-MCNC: 49 MG/DL (ref 0–150)
TSH SERPL DL<=0.05 MIU/L-ACNC: 1.94 UIU/ML (ref 0.27–4.2)
VIT B12 BLD-MCNC: 497 PG/ML (ref 211–946)
VLDLC SERPL-MCNC: 10 MG/DL (ref 5–40)
WBC NRBC COR # BLD: 3.44 10*3/MM3 (ref 3.4–10.8)

## 2023-01-17 PROCEDURE — 80050 GENERAL HEALTH PANEL: CPT

## 2023-01-17 PROCEDURE — 84466 ASSAY OF TRANSFERRIN: CPT

## 2023-01-17 PROCEDURE — 36415 COLL VENOUS BLD VENIPUNCTURE: CPT

## 2023-01-17 PROCEDURE — 83540 ASSAY OF IRON: CPT

## 2023-01-17 PROCEDURE — 82746 ASSAY OF FOLIC ACID SERUM: CPT

## 2023-01-17 PROCEDURE — 80061 LIPID PANEL: CPT

## 2023-01-17 PROCEDURE — 82607 VITAMIN B-12: CPT

## 2023-01-17 PROCEDURE — 84439 ASSAY OF FREE THYROXINE: CPT

## 2023-01-21 DIAGNOSIS — N95.1 MENOPAUSAL SYMPTOMS: ICD-10-CM

## 2023-01-23 RX ORDER — ESTRADIOL 0.5 MG/1
TABLET ORAL
Qty: 90 TABLET | Refills: 0 | Status: SHIPPED | OUTPATIENT
Start: 2023-01-23

## 2023-03-10 RX ORDER — LEVOTHYROXINE SODIUM 0.07 MG/1
TABLET ORAL
Qty: 90 TABLET | Refills: 0 | Status: SHIPPED | OUTPATIENT
Start: 2023-03-10

## 2023-04-14 DIAGNOSIS — N95.1 MENOPAUSAL SYMPTOMS: ICD-10-CM

## 2023-04-14 RX ORDER — ESTRADIOL 0.5 MG/1
TABLET ORAL
Qty: 90 TABLET | Refills: 0 | Status: SHIPPED | OUTPATIENT
Start: 2023-04-14 | End: 2023-04-20

## 2023-04-14 NOTE — TELEPHONE ENCOUNTER
The patient last discussed this Estradiol 0.5 mg tablet medication on 01/04/2022 with Sidra Kendall. Her pcp Yunier Pryor last filled this on 01/23/23.  Per protocol, I have sent 1 refill to her pharmacy but she will need to schedule an appointment for any future refills.

## 2023-04-20 ENCOUNTER — OFFICE VISIT (OUTPATIENT)
Dept: OBSTETRICS AND GYNECOLOGY | Facility: CLINIC | Age: 53
End: 2023-04-20
Payer: COMMERCIAL

## 2023-04-20 VITALS
WEIGHT: 153 LBS | SYSTOLIC BLOOD PRESSURE: 129 MMHG | HEART RATE: 78 BPM | BODY MASS INDEX: 24.01 KG/M2 | DIASTOLIC BLOOD PRESSURE: 82 MMHG | HEIGHT: 67 IN

## 2023-04-20 DIAGNOSIS — N95.1 MENOPAUSAL SYMPTOMS: ICD-10-CM

## 2023-04-20 DIAGNOSIS — Z79.890 HORMONE REPLACEMENT THERAPY (HRT): ICD-10-CM

## 2023-04-20 DIAGNOSIS — N89.8 VAGINAL ODOR: ICD-10-CM

## 2023-04-20 DIAGNOSIS — Z01.411 ENCOUNTER FOR GYNECOLOGICAL EXAMINATION WITH ABNORMAL FINDING: Primary | ICD-10-CM

## 2023-04-20 LAB
CANDIDA SPECIES: NEGATIVE
DEVELOPER EXPIRATION DATE: NORMAL
DEVELOPER LOT NUMBER: NORMAL
EXPIRATION DATE: NORMAL
FECAL OCCULT BLOOD SCREEN, POC: NEGATIVE
GARDNERELLA VAGINALIS: NEGATIVE
Lab: NORMAL
NEGATIVE CONTROL: NEGATIVE
POSITIVE CONTROL: POSITIVE
T VAGINALIS DNA VAG QL PROBE+SIG AMP: NEGATIVE

## 2023-04-20 PROCEDURE — 87480 CANDIDA DNA DIR PROBE: CPT | Performed by: OBSTETRICS & GYNECOLOGY

## 2023-04-20 PROCEDURE — 87660 TRICHOMONAS VAGIN DIR PROBE: CPT | Performed by: OBSTETRICS & GYNECOLOGY

## 2023-04-20 PROCEDURE — 87510 GARDNER VAG DNA DIR PROBE: CPT | Performed by: OBSTETRICS & GYNECOLOGY

## 2023-04-20 RX ORDER — ESTRADIOL 1 MG/1
1 TABLET ORAL DAILY
Qty: 90 TABLET | Refills: 4 | Status: SHIPPED | OUTPATIENT
Start: 2023-04-20

## 2023-04-20 NOTE — PROGRESS NOTES
"Well Woman Visit    CC: Annual well woman exam       HPI:   52 y.o. Contraception or HRT: using HRT, s/p HYST, still has one or both ovaries, benign indications  Menses:  none  Pain:  None  Incontinence concerns: No  Hx of abnormal pap:  No  Pt has concerns she would like to discuss. c/o ongoing vaginal odor. Had been swabbed for multiple infections a year ago and everything was negative. Was given vaginal estrogen and used it for a month or two and stopped because she did not notice an improvement. Denies d/c or itching. Also states has been taking estrogen 0.5 mg daily but still having hot flashes and night sweats.       History: PMHx, Meds, Allergies, PSHx, Social Hx, and POBHx all reviewed and updated.      PHYSICAL EXAM:  /82   Pulse 78   Ht 170.2 cm (67\")   Wt 69.4 kg (153 lb)   BMI 23.96 kg/m²   General- NAD, alert and oriented, appropriate  Psych- Normal mood, good memory  Neck- No masses, no thyroid enlargement  CV- Regular rhythm, no murnurs  Resp- CTA to bases, no wheezes  Abdomen- Soft, non distended, non tender, no masses    Breast left-  Bilaterally symmetrical, no masses, non tender, no nipple discharge  Breast right- Bilaterally symmetrical, no masses, non tender, no nipple discharge    External genitalia- Normal female, no lesions  Urethra/meatus- Normal, no masses, non tender, no prolapse  Bladder- Normal, no masses, non tender, no prolapse  Vagina- No atrophy, no lesions, scant white discharge, no prolapse  Cvx- Absent  Uterus- Absent  Adnexa- No mass, non tender  Anus/Rectum/Perineum- Small hemorrhoids, non thrombosed, Hemoccult neg    Lymphatic- No palpable neck, axillary, or groin nodes  Ext- No edema, no cyanosis    Skin- No lesions, no rashes, no acanthosis nigricans        ASSESSMENT and PLAN:  WWE, HRT/ERT Surveillance and Problem Visit    Diagnoses and all orders for this visit:    1. Encounter for gynecological examination with abnormal finding (Primary)  -     POC Occult " Blood Stool  -     Mammo Screening Digital Tomosynthesis Bilateral With CAD; Future    2. Vaginal odor  -     Gardnerella vaginalis, Trichomonas vaginalis, Candida albicans, DNA - Swab, Vagina    3. Menopausal symptoms  -     estradiol (ESTRACE) 1 MG tablet; Take 1 tablet by mouth Daily.  Dispense: 90 tablet; Refill: 4    4. Hormone replacement therapy (HRT)  -     estradiol (ESTRACE) 1 MG tablet; Take 1 tablet by mouth Daily.  Dispense: 90 tablet; Refill: 4    will check for infections. Discussed resuming vaginal estrogen. Also discussed trying rephresh. Will increase oral estrogen to see if that helps her menopause sx. She will rto prn.     Counseling:     OCP/Hormone use risk HUNTER    Domestic violence/abuse screen: negative    Depression screen: no SI    Preventative:   BREAST HEALTH- Monthly self breast exam importance and how to reviewed. MMG and/or MRI (prn) reviewed per society guidelines and her individual history. Mammo/MRI screen: Updated today.  CERVICAL CANCER Screening- Reviewed current ASCCP guidelines for screening w and wo cotest HPV, age specific.  Screen: Not medically needed.  COLON CANCER Screening- Reviewed current medical society guidelines and options.  Colonoscopy screen:  Already up to date.  SEXUAL HEALTH: Declines STD screening.  VACCINATIONS Recommended: Flu annually, Zoster (49yo and older).  Importance discussed, risk being unvaccinated reviewed.  Questions answered  Smoking status- NON SMOKER.  Importance of avoiding second hand smoke.  Follow up PCP/Specialist PMHx and Labs  Myriad: Does not qualify.      She understands the importance of having any ordered tests to be performed in a timely fashion.  She is encouraged to review her results online and/or contact or office if she has questions.     Follow Up:  Return if symptoms worsen or fail to improve.      Sidra Kendall, APRN  04/20/2023

## 2023-06-06 RX ORDER — LEVOTHYROXINE SODIUM 0.07 MG/1
TABLET ORAL
Qty: 90 TABLET | Refills: 0 | Status: SHIPPED | OUTPATIENT
Start: 2023-06-06

## 2023-06-15 ENCOUNTER — LAB (OUTPATIENT)
Dept: LAB | Facility: HOSPITAL | Age: 53
End: 2023-06-15
Payer: COMMERCIAL

## 2023-06-15 DIAGNOSIS — E03.9 HYPOTHYROIDISM, UNSPECIFIED TYPE: ICD-10-CM

## 2023-06-15 DIAGNOSIS — E78.5 HYPERLIPIDEMIA, UNSPECIFIED HYPERLIPIDEMIA TYPE: ICD-10-CM

## 2023-06-15 DIAGNOSIS — R82.90 ABNORMAL URINE ODOR: ICD-10-CM

## 2023-06-15 LAB
25(OH)D3 SERPL-MCNC: 36.5 NG/ML (ref 30–100)
ALBUMIN SERPL-MCNC: 4.1 G/DL (ref 3.5–5.2)
ALBUMIN/GLOB SERPL: 1.4 G/DL
ALP SERPL-CCNC: 61 U/L (ref 39–117)
ALT SERPL W P-5'-P-CCNC: 14 U/L (ref 1–33)
ANION GAP SERPL CALCULATED.3IONS-SCNC: 9.1 MMOL/L (ref 5–15)
AST SERPL-CCNC: 15 U/L (ref 1–32)
BILIRUB SERPL-MCNC: 0.5 MG/DL (ref 0–1.2)
BUN SERPL-MCNC: 13 MG/DL (ref 6–20)
BUN/CREAT SERPL: 16.7 (ref 7–25)
CALCIUM SPEC-SCNC: 9.3 MG/DL (ref 8.6–10.5)
CHLORIDE SERPL-SCNC: 106 MMOL/L (ref 98–107)
CHOLEST SERPL-MCNC: 204 MG/DL (ref 0–200)
CO2 SERPL-SCNC: 25.9 MMOL/L (ref 22–29)
CREAT SERPL-MCNC: 0.78 MG/DL (ref 0.57–1)
DEPRECATED RDW RBC AUTO: 38.7 FL (ref 37–54)
EGFRCR SERPLBLD CKD-EPI 2021: 92.7 ML/MIN/1.73
ERYTHROCYTE [DISTWIDTH] IN BLOOD BY AUTOMATED COUNT: 11.9 % (ref 12.3–15.4)
GLOBULIN UR ELPH-MCNC: 3 GM/DL
GLUCOSE SERPL-MCNC: 80 MG/DL (ref 65–99)
HBA1C MFR BLD: 5 % (ref 4.8–5.6)
HCT VFR BLD AUTO: 39.7 % (ref 34–46.6)
HDLC SERPL-MCNC: 68 MG/DL (ref 40–60)
HGB BLD-MCNC: 13.2 G/DL (ref 12–15.9)
IRON 24H UR-MRATE: 102 MCG/DL (ref 37–145)
IRON SATN MFR SERPL: 30 % (ref 20–50)
LDLC SERPL CALC-MCNC: 124 MG/DL (ref 0–100)
LDLC/HDLC SERPL: 1.81 {RATIO}
MAGNESIUM SERPL-MCNC: 2.3 MG/DL (ref 1.6–2.6)
MCH RBC QN AUTO: 29.5 PG (ref 26.6–33)
MCHC RBC AUTO-ENTMCNC: 33.2 G/DL (ref 31.5–35.7)
MCV RBC AUTO: 88.8 FL (ref 79–97)
PLATELET # BLD AUTO: 325 10*3/MM3 (ref 140–450)
PMV BLD AUTO: 10.6 FL (ref 6–12)
POTASSIUM SERPL-SCNC: 4.3 MMOL/L (ref 3.5–5.2)
PROT SERPL-MCNC: 7.1 G/DL (ref 6–8.5)
RBC # BLD AUTO: 4.47 10*6/MM3 (ref 3.77–5.28)
SODIUM SERPL-SCNC: 141 MMOL/L (ref 136–145)
T4 FREE SERPL-MCNC: 1.17 NG/DL (ref 0.93–1.7)
TIBC SERPL-MCNC: 335 MCG/DL (ref 298–536)
TRANSFERRIN SERPL-MCNC: 225 MG/DL (ref 200–360)
TRIGL SERPL-MCNC: 64 MG/DL (ref 0–150)
TSH SERPL DL<=0.05 MIU/L-ACNC: 3.48 UIU/ML (ref 0.27–4.2)
VIT B12 BLD-MCNC: 399 PG/ML (ref 211–946)
VLDLC SERPL-MCNC: 12 MG/DL (ref 5–40)
WBC NRBC COR # BLD: 4.5 10*3/MM3 (ref 3.4–10.8)

## 2023-06-15 PROCEDURE — 84144 ASSAY OF PROGESTERONE: CPT | Performed by: FAMILY MEDICINE

## 2023-06-15 PROCEDURE — 82607 VITAMIN B-12: CPT

## 2023-06-15 PROCEDURE — 82672 ASSAY OF ESTROGEN: CPT | Performed by: FAMILY MEDICINE

## 2023-06-15 PROCEDURE — 83001 ASSAY OF GONADOTROPIN (FSH): CPT | Performed by: FAMILY MEDICINE

## 2023-06-15 PROCEDURE — 83735 ASSAY OF MAGNESIUM: CPT

## 2023-06-15 PROCEDURE — 84439 ASSAY OF FREE THYROXINE: CPT

## 2023-06-15 PROCEDURE — 83002 ASSAY OF GONADOTROPIN (LH): CPT | Performed by: FAMILY MEDICINE

## 2023-06-15 PROCEDURE — 83036 HEMOGLOBIN GLYCOSYLATED A1C: CPT

## 2023-06-15 PROCEDURE — 80050 GENERAL HEALTH PANEL: CPT

## 2023-06-15 PROCEDURE — 83540 ASSAY OF IRON: CPT

## 2023-06-15 PROCEDURE — 36415 COLL VENOUS BLD VENIPUNCTURE: CPT

## 2023-06-15 PROCEDURE — 82306 VITAMIN D 25 HYDROXY: CPT

## 2023-06-15 PROCEDURE — 84466 ASSAY OF TRANSFERRIN: CPT

## 2023-06-15 PROCEDURE — 80061 LIPID PANEL: CPT

## 2023-06-29 PROBLEM — E78.00 ELEVATED LDL CHOLESTEROL LEVEL: Status: ACTIVE | Noted: 2023-06-29

## 2023-06-29 PROBLEM — O99.019 ANEMIA COMPLICATING PREGNANCY, UNSPECIFIED TRIMESTER: Status: ACTIVE | Noted: 2023-06-29

## 2023-06-29 PROBLEM — R51.9 PERSISTENT HEADACHES: Status: ACTIVE | Noted: 2023-06-29

## 2023-06-29 PROBLEM — M54.2 NECK PAIN: Status: ACTIVE | Noted: 2023-06-29

## 2023-06-29 PROBLEM — Z12.31 SCREENING MAMMOGRAM, ENCOUNTER FOR: Status: ACTIVE | Noted: 2023-06-29

## 2023-09-05 RX ORDER — LEVOTHYROXINE SODIUM 0.07 MG/1
TABLET ORAL
Qty: 90 TABLET | Refills: 0 | Status: SHIPPED | OUTPATIENT
Start: 2023-09-05

## 2023-09-13 DIAGNOSIS — N94.10 DYSPAREUNIA IN FEMALE: ICD-10-CM

## 2023-09-13 DIAGNOSIS — N95.1 MENOPAUSAL FLUSHING: ICD-10-CM

## 2023-09-13 RX ORDER — PAROXETINE HYDROCHLORIDE 20 MG/1
20 TABLET, FILM COATED ORAL EVERY MORNING
Qty: 30 TABLET | Refills: 2 | Status: SHIPPED | OUTPATIENT
Start: 2023-09-13

## 2023-11-13 DIAGNOSIS — N95.1 MENOPAUSAL FLUSHING: ICD-10-CM

## 2023-11-13 DIAGNOSIS — N94.10 DYSPAREUNIA IN FEMALE: ICD-10-CM

## 2023-11-13 RX ORDER — PAROXETINE HYDROCHLORIDE 20 MG/1
20 TABLET, FILM COATED ORAL EVERY MORNING
Qty: 90 TABLET | Refills: 1 | Status: SHIPPED | OUTPATIENT
Start: 2023-11-13

## 2023-12-07 RX ORDER — LEVOTHYROXINE SODIUM 0.07 MG/1
TABLET ORAL
Qty: 90 TABLET | Refills: 0 | Status: SHIPPED | OUTPATIENT
Start: 2023-12-07

## 2024-03-06 RX ORDER — LEVOTHYROXINE SODIUM 0.07 MG/1
TABLET ORAL
Qty: 90 TABLET | Refills: 0 | Status: SHIPPED | OUTPATIENT
Start: 2024-03-06

## 2024-05-08 DIAGNOSIS — N95.1 MENOPAUSAL FLUSHING: ICD-10-CM

## 2024-05-08 DIAGNOSIS — N94.10 DYSPAREUNIA IN FEMALE: ICD-10-CM

## 2024-05-08 RX ORDER — PAROXETINE HYDROCHLORIDE 20 MG/1
20 TABLET, FILM COATED ORAL EVERY MORNING
Qty: 90 TABLET | Refills: 1 | Status: SHIPPED | OUTPATIENT
Start: 2024-05-08

## 2024-06-03 RX ORDER — LEVOTHYROXINE SODIUM 0.07 MG/1
TABLET ORAL
Qty: 90 TABLET | Refills: 0 | Status: SHIPPED | OUTPATIENT
Start: 2024-06-03

## 2024-08-13 ENCOUNTER — LAB (OUTPATIENT)
Dept: LAB | Facility: HOSPITAL | Age: 54
End: 2024-08-13
Payer: COMMERCIAL

## 2024-08-13 ENCOUNTER — OFFICE VISIT (OUTPATIENT)
Dept: FAMILY MEDICINE CLINIC | Facility: CLINIC | Age: 54
End: 2024-08-13
Payer: COMMERCIAL

## 2024-08-13 VITALS
BODY MASS INDEX: 26.46 KG/M2 | OXYGEN SATURATION: 96 % | DIASTOLIC BLOOD PRESSURE: 68 MMHG | TEMPERATURE: 98 F | RESPIRATION RATE: 16 BRPM | SYSTOLIC BLOOD PRESSURE: 108 MMHG | HEIGHT: 67 IN | HEART RATE: 68 BPM | WEIGHT: 168.6 LBS

## 2024-08-13 DIAGNOSIS — N94.10 DYSPAREUNIA IN FEMALE: ICD-10-CM

## 2024-08-13 DIAGNOSIS — N95.1 MENOPAUSAL FLUSHING: ICD-10-CM

## 2024-08-13 DIAGNOSIS — Z12.31 SCREENING MAMMOGRAM FOR BREAST CANCER: ICD-10-CM

## 2024-08-13 DIAGNOSIS — Z11.59 NEED FOR HEPATITIS C SCREENING TEST: ICD-10-CM

## 2024-08-13 DIAGNOSIS — E03.9 HYPOTHYROIDISM, UNSPECIFIED TYPE: Chronic | ICD-10-CM

## 2024-08-13 DIAGNOSIS — Z00.00 PHYSICAL EXAM, ANNUAL: Primary | ICD-10-CM

## 2024-08-13 DIAGNOSIS — R68.82 LOW LIBIDO: ICD-10-CM

## 2024-08-13 LAB
ALBUMIN SERPL-MCNC: 4.2 G/DL (ref 3.5–5.2)
ALBUMIN/GLOB SERPL: 1.4 G/DL
ALP SERPL-CCNC: 98 U/L (ref 39–117)
ALT SERPL W P-5'-P-CCNC: 19 U/L (ref 1–33)
ANION GAP SERPL CALCULATED.3IONS-SCNC: 9.1 MMOL/L (ref 5–15)
AST SERPL-CCNC: 22 U/L (ref 1–32)
BILIRUB SERPL-MCNC: <0.2 MG/DL (ref 0–1.2)
BUN SERPL-MCNC: 16 MG/DL (ref 6–20)
BUN/CREAT SERPL: 13.6 (ref 7–25)
CALCIUM SPEC-SCNC: 9.4 MG/DL (ref 8.6–10.5)
CHLORIDE SERPL-SCNC: 103 MMOL/L (ref 98–107)
CHOLEST SERPL-MCNC: 213 MG/DL (ref 0–200)
CO2 SERPL-SCNC: 27.9 MMOL/L (ref 22–29)
CREAT SERPL-MCNC: 1.18 MG/DL (ref 0.57–1)
DEPRECATED RDW RBC AUTO: 39.9 FL (ref 37–54)
EGFRCR SERPLBLD CKD-EPI 2021: 56 ML/MIN/1.73
ERYTHROCYTE [DISTWIDTH] IN BLOOD BY AUTOMATED COUNT: 12.3 % (ref 12.3–15.4)
GLOBULIN UR ELPH-MCNC: 3 GM/DL
GLUCOSE SERPL-MCNC: 80 MG/DL (ref 65–99)
HCT VFR BLD AUTO: 40 % (ref 34–46.6)
HCV AB SER QL: NORMAL
HDLC SERPL-MCNC: 58 MG/DL (ref 40–60)
HGB BLD-MCNC: 13.2 G/DL (ref 12–15.9)
LDLC SERPL CALC-MCNC: 140 MG/DL (ref 0–100)
LDLC/HDLC SERPL: 2.38 {RATIO}
MCH RBC QN AUTO: 29.3 PG (ref 26.6–33)
MCHC RBC AUTO-ENTMCNC: 33 G/DL (ref 31.5–35.7)
MCV RBC AUTO: 88.9 FL (ref 79–97)
PLATELET # BLD AUTO: 358 10*3/MM3 (ref 140–450)
PMV BLD AUTO: 10.2 FL (ref 6–12)
POTASSIUM SERPL-SCNC: 4.3 MMOL/L (ref 3.5–5.2)
PROGEST SERPL-MCNC: 0.13 NG/ML
PROT SERPL-MCNC: 7.2 G/DL (ref 6–8.5)
RBC # BLD AUTO: 4.5 10*6/MM3 (ref 3.77–5.28)
SODIUM SERPL-SCNC: 140 MMOL/L (ref 136–145)
T4 FREE SERPL-MCNC: 1.05 NG/DL (ref 0.92–1.68)
TESTOST SERPL-MCNC: <2.5 NG/DL (ref 2.9–40.8)
TRIGL SERPL-MCNC: 85 MG/DL (ref 0–150)
TSH SERPL DL<=0.05 MIU/L-ACNC: 3.46 UIU/ML (ref 0.27–4.2)
VLDLC SERPL-MCNC: 15 MG/DL (ref 5–40)
WBC NRBC COR # BLD AUTO: 5.69 10*3/MM3 (ref 3.4–10.8)

## 2024-08-13 PROCEDURE — 80050 GENERAL HEALTH PANEL: CPT

## 2024-08-13 PROCEDURE — 82672 ASSAY OF ESTROGEN: CPT

## 2024-08-13 PROCEDURE — 80061 LIPID PANEL: CPT

## 2024-08-13 PROCEDURE — 84403 ASSAY OF TOTAL TESTOSTERONE: CPT

## 2024-08-13 PROCEDURE — 86803 HEPATITIS C AB TEST: CPT

## 2024-08-13 PROCEDURE — 36415 COLL VENOUS BLD VENIPUNCTURE: CPT

## 2024-08-13 PROCEDURE — 84439 ASSAY OF FREE THYROXINE: CPT

## 2024-08-13 PROCEDURE — 84144 ASSAY OF PROGESTERONE: CPT

## 2024-08-13 PROCEDURE — 99396 PREV VISIT EST AGE 40-64: CPT | Performed by: FAMILY MEDICINE

## 2024-08-13 RX ORDER — DIPHENHYDRAMINE HCL 25 MG
1 CAPSULE ORAL 2 TIMES DAILY
Qty: 30 ML | Refills: 0 | Status: SHIPPED | OUTPATIENT
Start: 2024-08-13

## 2024-08-13 RX ORDER — PAROXETINE HYDROCHLORIDE 40 MG/1
40 TABLET, FILM COATED ORAL EVERY MORNING
Qty: 30 TABLET | Refills: 2 | Status: SHIPPED | OUTPATIENT
Start: 2024-08-13

## 2024-08-13 NOTE — PROGRESS NOTES
"Chief Complaint  Annual Exam (Thyroid check. )    Subjective          Lucina Bustos presents to [facility]  History of Present Illness    Patient presents for annual exam and check labs on thyroid    Having some complaints of decreased libido     Objective   Vital Signs:   /68 (BP Location: Left arm, Patient Position: Sitting, Cuff Size: Adult)   Pulse 68   Temp 98 °F (36.7 °C)   Resp 16   Ht 170.2 cm (67\")   Wt 76.5 kg (168 lb 9.6 oz)   SpO2 96%   BMI 26.41 kg/m²     BMI is >= 25 and <30. (Overweight) The following options were offered after discussion;: exercise counseling/recommendations      Physical Exam  Vitals reviewed.   Constitutional:       Appearance: Normal appearance. She is well-developed.   HENT:      Head: Normocephalic and atraumatic.      Right Ear: External ear normal.      Left Ear: External ear normal.      Nose: Nose normal.   Eyes:      Conjunctiva/sclera: Conjunctivae normal.      Pupils: Pupils are equal, round, and reactive to light.   Cardiovascular:      Rate and Rhythm: Normal rate.   Pulmonary:      Effort: Pulmonary effort is normal.      Breath sounds: Normal breath sounds.   Abdominal:      General: There is no distension.   Skin:     General: Skin is warm and dry.   Neurological:      Mental Status: She is alert and oriented to person, place, and time. Mental status is at baseline.   Psychiatric:         Mood and Affect: Mood and affect normal.         Behavior: Behavior normal.         Thought Content: Thought content normal.         Judgment: Judgment normal.          Result Review :   The following data was reviewed by: Yunier Pryor DO on 08/13/2024:  Common labs          8/13/2024    16:08   Common Labs   Glucose 80    BUN 16    Creatinine 1.18    Sodium 140    Potassium 4.3    Chloride 103    Calcium 9.4    Albumin 4.2    Total Bilirubin <0.2    Alkaline Phosphatase 98    AST (SGOT) 22    ALT (SGPT) 19    WBC 5.69    Hemoglobin " 13.2    Hematocrit 40.0    Platelets 358    Total Cholesterol 213    Triglycerides 85    HDL Cholesterol 58    LDL Cholesterol  140                    Assessment and Plan    Diagnoses and all orders for this visit:    1. Physical exam, annual (Primary)    2. Menopausal flushing  -     Lipid Panel; Future  -     Comprehensive Metabolic Panel; Future  -     CBC (No Diff); Future  -     Testosterone; Future  -     Estrogens, Total; Future  -     Progesterone; Future  -     PARoxetine (PAXIL) 40 MG tablet; Take 1 tablet by mouth Every Morning.  Dispense: 30 tablet; Refill: 2    3. Dyspareunia in female  -     Lipid Panel; Future  -     Comprehensive Metabolic Panel; Future  -     CBC (No Diff); Future  -     Testosterone; Future  -     Estrogens, Total; Future  -     Progesterone; Future  -     PARoxetine (PAXIL) 40 MG tablet; Take 1 tablet by mouth Every Morning.  Dispense: 30 tablet; Refill: 2    4. Hypothyroidism, unspecified type  -     TSH+Free T4; Future  -     Testosterone; Future  -     Estrogens, Total; Future  -     Progesterone; Future    5. Need for hepatitis C screening test  -     Hepatitis C antibody; Future    6. Low libido  -     Lipid Panel; Future  -     Comprehensive Metabolic Panel; Future  -     CBC (No Diff); Future  -     Testosterone; Future  -     Estrogens, Total; Future  -     Progesterone; Future    7. Screening mammogram for breast cancer  -     Mammo Screening Digital Tomosynthesis Bilateral With CAD; Future    Other orders  -     Dextran 70-Hypromellose (Artificial Tears) 0.1-0.3 % solution; Apply 1 drop to eye(s) as directed by provider 2 (Two) Times a Day.  Dispense: 30 mL; Refill: 0      Reviewed chronic conditions, age risk factors and will order labs today to manage, screen and follow up at least every 12 months    Recommend appropriate cancer screens that are age appropriate unless already previously performed or not currently due to be repeated    Recommend wearing sunscreen and  following up on any concerning skin conditions or lesions, wearing seat belts and other risk reduction measures to lessen incident of death, disease or other     Counseled on risks, disease and advice given on screening and continued management of health and condition through the next year until next physical     Paxil increase then down if no better for veozah      Follow Up   Return if symptoms worsen or fail to improve, for Next scheduled follow up, yearly for physical.  Patient was given instructions and counseling regarding her condition or for health maintenance advice. Please see specific information pulled into the AVS if appropriate.     Transcribed from ambient dictation for Yunier Pryor DO by Yunier Pryor DO.  08/13/24   15:58 EDT

## 2024-08-15 ENCOUNTER — PATIENT ROUNDING (BHMG ONLY) (OUTPATIENT)
Dept: FAMILY MEDICINE CLINIC | Facility: CLINIC | Age: 54
End: 2024-08-15
Payer: COMMERCIAL

## 2024-08-19 LAB — ESTROGEN SERPL-MCNC: 259 PG/ML

## 2024-08-27 RX ORDER — LEVOTHYROXINE SODIUM 75 UG/1
TABLET ORAL
Qty: 90 TABLET | Refills: 0 | Status: SHIPPED | OUTPATIENT
Start: 2024-08-27

## 2024-09-04 ENCOUNTER — HOSPITAL ENCOUNTER (OUTPATIENT)
Dept: MAMMOGRAPHY | Facility: HOSPITAL | Age: 54
Discharge: HOME | End: 2024-09-04
Admitting: FAMILY MEDICINE
Payer: COMMERCIAL

## 2024-09-04 DIAGNOSIS — Z12.31 SCREENING MAMMOGRAM FOR BREAST CANCER: ICD-10-CM

## 2024-09-04 PROCEDURE — 77067 SCR MAMMO BI INCL CAD: CPT

## 2024-09-04 PROCEDURE — 77063 BREAST TOMOSYNTHESIS BI: CPT

## 2024-09-06 ENCOUNTER — TELEPHONE (OUTPATIENT)
Dept: FAMILY MEDICINE CLINIC | Facility: CLINIC | Age: 54
End: 2024-09-06
Payer: COMMERCIAL

## 2024-09-06 DIAGNOSIS — N95.1 MENOPAUSAL FLUSHING: ICD-10-CM

## 2024-09-06 DIAGNOSIS — N94.10 DYSPAREUNIA IN FEMALE: ICD-10-CM

## 2024-09-06 DIAGNOSIS — R23.2 HOT FLASHES: Primary | ICD-10-CM

## 2024-09-06 RX ORDER — PAROXETINE 40 MG/1
40 TABLET, FILM COATED ORAL EVERY MORNING
Qty: 90 TABLET | Refills: 1 | Status: SHIPPED | OUTPATIENT
Start: 2024-09-06

## 2024-09-06 NOTE — TELEPHONE ENCOUNTER
Caller: Lucina Bustos    Relationship: Self    Best call back number: [phone]     Which medication are you concerned about:     PARoxetine (PAXIL) 40 MG tablet       Who prescribed you this medication: DO WRIGHT    When did you start taking this medication: 8.14.2024    What are your concerns: PATIENT DID TAKE THE INCREASED DOSAGE OF MEDICATION AND STATES THAT IT DID NOT HELP WITH THE SYMPTOMS OF HOT FLASHES AND THAT SHE IS FEELING ANGRY NOW ALL THE TIME. PATIENT WOULD LIKE TO BE ADVISED IF SHE IS TO GO BACK DOWN TO THE ORIGINAL DOSING OF 20MG OR WHAT IS ADVISED.

## 2024-09-09 RX ORDER — FEZOLINETANT 45 MG/1
45 TABLET, FILM COATED ORAL EVERY 24 HOURS
Qty: 30 TABLET | Refills: 5 | Status: SHIPPED | OUTPATIENT
Start: 2024-09-09

## 2024-09-09 NOTE — TELEPHONE ENCOUNTER
Scheduled follow up appointment for pt.   Adv to go back to original dosage of paxil.     Pt said that Dr. Pryor would call in a RX to help with hot flashes?

## 2024-09-24 ENCOUNTER — OFFICE VISIT (OUTPATIENT)
Dept: FAMILY MEDICINE CLINIC | Facility: CLINIC | Age: 54
End: 2024-09-24
Payer: COMMERCIAL

## 2024-09-24 VITALS
SYSTOLIC BLOOD PRESSURE: 126 MMHG | HEART RATE: 71 BPM | OXYGEN SATURATION: 99 % | WEIGHT: 171.2 LBS | HEIGHT: 67 IN | RESPIRATION RATE: 18 BRPM | BODY MASS INDEX: 26.87 KG/M2 | TEMPERATURE: 98 F | DIASTOLIC BLOOD PRESSURE: 67 MMHG

## 2024-09-24 DIAGNOSIS — N95.1 MENOPAUSAL FLUSHING: ICD-10-CM

## 2024-09-24 DIAGNOSIS — N95.1 PERIMENOPAUSAL SYMPTOMS: ICD-10-CM

## 2024-09-24 DIAGNOSIS — E03.9 HYPOTHYROIDISM, UNSPECIFIED TYPE: ICD-10-CM

## 2024-09-24 DIAGNOSIS — N94.10 DYSPAREUNIA IN FEMALE: ICD-10-CM

## 2024-09-24 DIAGNOSIS — R23.2 HOT FLASHES: Primary | ICD-10-CM

## 2024-09-24 PROCEDURE — 99213 OFFICE O/P EST LOW 20 MIN: CPT | Performed by: FAMILY MEDICINE

## 2024-09-26 RX ORDER — FEZOLINETANT 45 MG/1
45 TABLET, FILM COATED ORAL EVERY 24 HOURS
Qty: 30 TABLET | Refills: 2 | Status: SHIPPED | OUTPATIENT
Start: 2024-09-26

## 2024-10-23 ENCOUNTER — TELEPHONE (OUTPATIENT)
Dept: OBSTETRICS AND GYNECOLOGY | Facility: CLINIC | Age: 54
End: 2024-10-23
Payer: COMMERCIAL

## 2024-10-23 NOTE — TELEPHONE ENCOUNTER
Caller: Lucina Bustos    Relationship: Self    Best call back number: [phone]    Who is your current provider:     Is your current provider offboarding? NO    Who would you like your new provider to be:     What are your reasons for transferring care: PT WAS RECOMMENDED  FOR MENOPAUSE ISSUES    Additional notes: PT WOULD LIKE TO SCHEDULE AN APPT WITH

## 2024-11-02 DIAGNOSIS — N94.10 DYSPAREUNIA IN FEMALE: ICD-10-CM

## 2024-11-02 DIAGNOSIS — N95.1 MENOPAUSAL FLUSHING: ICD-10-CM

## 2024-11-04 RX ORDER — PAROXETINE 20 MG/1
20 TABLET, FILM COATED ORAL EVERY MORNING
Qty: 90 TABLET | Refills: 1 | OUTPATIENT
Start: 2024-11-04

## 2024-11-19 ENCOUNTER — OFFICE VISIT (OUTPATIENT)
Dept: FAMILY MEDICINE CLINIC | Facility: CLINIC | Age: 54
End: 2024-11-19
Payer: COMMERCIAL

## 2024-11-19 ENCOUNTER — HOSPITAL ENCOUNTER (OUTPATIENT)
Dept: GENERAL RADIOLOGY | Facility: HOSPITAL | Age: 54
Discharge: HOME | End: 2024-11-19
Admitting: FAMILY MEDICINE
Payer: COMMERCIAL

## 2024-11-19 VITALS
TEMPERATURE: 98.9 F | HEIGHT: 67 IN | OXYGEN SATURATION: 97 % | HEART RATE: 87 BPM | SYSTOLIC BLOOD PRESSURE: 125 MMHG | RESPIRATION RATE: 16 BRPM | WEIGHT: 168.2 LBS | DIASTOLIC BLOOD PRESSURE: 63 MMHG | BODY MASS INDEX: 26.4 KG/M2

## 2024-11-19 DIAGNOSIS — G89.29 CHRONIC RIGHT SHOULDER PAIN: ICD-10-CM

## 2024-11-19 DIAGNOSIS — M25.511 CHRONIC RIGHT SHOULDER PAIN: ICD-10-CM

## 2024-11-19 DIAGNOSIS — M77.8 RIGHT ELBOW TENDINITIS: Primary | Chronic | ICD-10-CM

## 2024-11-19 DIAGNOSIS — M77.8 RIGHT ELBOW TENDINITIS: Chronic | ICD-10-CM

## 2024-11-19 PROCEDURE — 73030 X-RAY EXAM OF SHOULDER: CPT

## 2024-11-19 PROCEDURE — 99213 OFFICE O/P EST LOW 20 MIN: CPT | Performed by: FAMILY MEDICINE

## 2024-11-19 PROCEDURE — 73070 X-RAY EXAM OF ELBOW: CPT

## 2024-11-19 RX ORDER — DICLOFENAC SODIUM 75 MG/1
75 TABLET, DELAYED RELEASE ORAL 2 TIMES DAILY PRN
Qty: 60 TABLET | Refills: 2 | Status: SHIPPED | OUTPATIENT
Start: 2024-11-19

## 2024-11-25 RX ORDER — LEVOTHYROXINE SODIUM 75 UG/1
TABLET ORAL
Qty: 90 TABLET | Refills: 0 | Status: SHIPPED | OUTPATIENT
Start: 2024-11-25

## 2024-12-11 NOTE — PROGRESS NOTES
GYN Visit    CC: menopause symptoms    HPI:   54 y.o. Contraception or HRT: using no HRT, hysterectomy    History of Present Illness  The patient is a 54-year-old female who presents for evaluation of menopause.    She underwent a partial hysterectomy in 2013 and is currently experiencing a range of menopausal symptoms, including joint pain, headaches, sleep disturbances, anxiety, rage, brain fog, tinnitus, oral burning sensation, weight gain, and decreased libido. She has gained 40 pounds from her usual weight of 130 pounds. She reports a lack of desire for any activities and has been experiencing these symptoms for an extended period. She occasionally experiences unexplained anger upon waking. She has no history of cardiac disease, thromboembolic events, or breast cancer. She does not consume alcohol. She has been taking melatonin at night, which has improved her sleep quality. She has also been taking supplements containing calcium, magnesium, chromium, and sodium, as well as probiotics. She reports urinating 3 to 4 times during the night. She has been experiencing elbow and shoulder pain for the past 6 months, for which she was prescribed a pain medication and muscle relaxant, which she discontinued after a week due to symptom resolution. She has been engaging in prayer, which she finds beneficial. She was previously on estrogen therapy prescribed by Dr. Man but was taken off this medication by her new family physician, Dr. Pryor, who instead prescribed an antidepressant. This year, she requested an increase in her antidepressant dosage, which was subsequently reduced due to adverse effects. She was then switched to Veozah for hot flashes, which she reports as being effective but not completely alleviating the symptoms.    Supplemental Information  She had an x-ray of her elbow and shoulder, which was normal.    SOCIAL HISTORY  She does not drink alcohol.    MEDICATIONS  Current: melatonin,  "Veozah       History: PMHx, Meds, Allergies, PSHx, Social Hx, and POBHx all reviewed and updated.    PHYSICAL EXAM:  /77   Pulse 84   Ht 170.2 cm (67\")   Wt 76.2 kg (168 lb)   Breastfeeding No   BMI 26.31 kg/m²   General- NAD, alert and oriented, appropriate  Psych- Normal mood, good memory          The 10-year ASCVD risk score (Sunni YUN, et al., 2019) is: 1.2%    Values used to calculate the score:      Age: 54 years      Sex: Female      Is Non- : No      Diabetic: No      Tobacco smoker: No      Systolic Blood Pressure: 114 mmHg      Is BP treated: No      HDL Cholesterol: 58 mg/dL      Total Cholesterol: 213 mg/dL       ASSESSMENT AND PLAN:  Diagnoses and all orders for this visit:    1. Menopausal symptoms (Primary)  -     estradiol (Minivelle) 0.05 MG/24HR patch; Place 1 patch on the skin as directed by provider 2 (Two) Times a Week.  Dispense: 24 patch; Refill: 3    2. Genitourinary syndrome of menopause  -     estradiol (ESTRACE VAGINAL) 0.1 MG/GM vaginal cream; Place 0.5 gm PV and massage 0.5 gm to vulva and vestibule at night 2x/week  Dispense: 42.5 g; Refill: 3        Assessment & Plan  1. Menopause.  Her symptoms, including joint pain, headaches, difficulty sleeping, anxiety, brain fog, weight gain, and decreased libido, are consistent with menopause. She has a history of a partial hysterectomy in November 2013. She has no history of heart disease, high cholesterol, blood clots, or breast cancer. Her risk of cardiovascular disease in the next 10 years is calculated to be 1.2%. She was advised to discontinue her current supplements and Veozah. A book titled \"You Are Not Broken\" was recommended for further understanding of menopause and its impact on sex drive. She will be started on estrogen therapy, specifically an estrogen patch to be applied twice weekly.     2.  GSM  Vaginal estrogen will also be prescribed to alleviate symptoms of dryness, painful intercourse, " overactive bladder, and nocturia. The vaginal estrogen should be applied internally and externally at bedtime twice a week.     3.  Health maintenance  She was advised to maintain a daily protein intake of 90 g, divided into three meals. Magnesium supplements were recommended at bedtime to aid sleep and cognitive function. Turmeric was suggested as an over-the-counter supplement for joint pain management.    PROCEDURE  The patient underwent a partial hysterectomy in November 2013.             Follow Up:  Return in about 3 months (around 3/12/2025).    I spent 30 minutes caring for Lucina on this date of service. This time includes time spent by me in the following activities:obtaining and/or reviewing a separately obtained history, performing a medically appropriate examination and/or evaluation , counseling and educating the patient/family/caregiver, ordering medications, tests, or procedures, and documenting information in the medical record      Iraida Monsivais MD  12/12/2024    Patient or patient representative verbalized consent for the use of Ambient Listening during the visit with  Iraida Monsivais MD for chart documentation. 12/12/2024  19:29 EST

## 2024-12-12 ENCOUNTER — OFFICE VISIT (OUTPATIENT)
Dept: OBSTETRICS AND GYNECOLOGY | Facility: CLINIC | Age: 54
End: 2024-12-12
Payer: COMMERCIAL

## 2024-12-12 VITALS
BODY MASS INDEX: 26.37 KG/M2 | HEART RATE: 84 BPM | SYSTOLIC BLOOD PRESSURE: 114 MMHG | HEIGHT: 67 IN | DIASTOLIC BLOOD PRESSURE: 77 MMHG | WEIGHT: 168 LBS

## 2024-12-12 DIAGNOSIS — N95.1 MENOPAUSAL SYMPTOMS: Primary | ICD-10-CM

## 2024-12-12 DIAGNOSIS — N95.8 GENITOURINARY SYNDROME OF MENOPAUSE: ICD-10-CM

## 2024-12-12 RX ORDER — ESTRADIOL 0.1 MG/G
CREAM VAGINAL
Qty: 42.5 G | Refills: 3 | Status: SHIPPED | OUTPATIENT
Start: 2024-12-12

## 2024-12-12 RX ORDER — ESTRADIOL 0.05 MG/D
1 PATCH, EXTENDED RELEASE TRANSDERMAL 2 TIMES WEEKLY
Qty: 24 PATCH | Refills: 3 | Status: SHIPPED | OUTPATIENT
Start: 2024-12-12

## 2024-12-12 NOTE — PATIENT INSTRUCTIONS
Protein 1-1.2 gm/kg minimum of 90 gm/d    Magnesium supplements at bedtime ( Calm)  Tumeric supplement for pain    You are Not Broken by Daya Grier MD

## 2024-12-19 ENCOUNTER — PATIENT MESSAGE (OUTPATIENT)
Dept: OBSTETRICS AND GYNECOLOGY | Facility: CLINIC | Age: 54
End: 2024-12-19
Payer: COMMERCIAL

## 2024-12-23 NOTE — TELEPHONE ENCOUNTER
Patient called checking on the status of her previous message, please see that above.I did inform patient not to use 2 patches at once. Dr. Monsivais is out of the office this week. Please advise as DOTD. She was previously on Veozah, is she able to take this along with the patch?

## 2024-12-23 NOTE — TELEPHONE ENCOUNTER
Per Dr. Roseanna AQUINO:    Yes Dave would be fine to try again since it is non-hormonal. She may need an adjustment on dosage of the patch. Advise to schedule OV to discuss further.   Patient voiced her understanding. Will follow back up with patient once we have Dr. Monsivais's recommendations.

## 2025-03-03 RX ORDER — LEVOTHYROXINE SODIUM 75 UG/1
TABLET ORAL
Qty: 90 TABLET | Refills: 0 | Status: SHIPPED | OUTPATIENT
Start: 2025-03-03

## 2025-03-20 NOTE — PROGRESS NOTES
"GYN Visit    CC: Menopause symptoms    HPI:   54 y.o. Contraception or HRT: Post menopausal, using HRT, s/p HYST, still has one or both ovaries, benign indications    History of Present Illness  The patient presents for evaluation of menopausal symptoms.    She reports a 50% reduction in her menopausal symptoms since starting the estrogen patch and vaginal cream. However, she continues to experience hot flashes, although less severe than before. Her sleep quality has improved, but she still experiences pain and bleeding during intercourse, albeit less severe. She also reports a lack of sexual arousal for several years. She is currently on a daily regimen of turmeric and magnesium, taking one tablet of each. She has been using the estrogen patch and vaginal cream.    MEDICATIONS  Current: Estrogen patch, estrogen vaginal cream, turmeric, magnesium         History: PMHx, Meds, Allergies, PSHx, Social Hx, and POBHx all reviewed and updated.  Physical Exam   PHYSICAL EXAM:  /72   Pulse 86   Ht 170.2 cm (67\")   Wt 65.3 kg (144 lb)   BMI 22.55 kg/m²   General- NAD, alert and oriented, appropriate  Psych- Normal mood, good memory      ASSESSMENT AND PLAN:  Diagnoses and all orders for this visit:    1. Menopausal symptoms (Primary)  -     estradiol (Minivelle) 0.075 MG/24HR patch; Place 1 patch on the skin as directed by provider 2 (Two) Times a Week.  Dispense: 24 patch; Refill: 3    2. Genitourinary syndrome of menopause    3. Female dyspareunia    4. Decreased libido        Assessment & Plan  1. Menopausal symptoms.  Her menopausal symptoms have shown significant improvement, with a reported 50% reduction. However, she continues to experience hot flashes, pain, and bleeding during intercourse. The dosage of her estrogen patch will be increased from 0.05 mg to 0.075 mg to further alleviate these symptoms. The current regimen of turmeric and magnesium will be maintained. She is instructed to cut the " existing patches in half and use them in combination to achieve the new dosage until the current supply is exhausted. A new prescription for the increased dose will be provided for future refills.      2.  GSM  Patient states she is having some improvement in vaginal dryness and dyspareunia.  She is advised to use personal lubricants during intercourse to reduce pain and bleeding.  If her symptoms have not resolved by next appointment we will do a more thorough evaluation    3.  Decreased libido  Patient states she has decreased to absent libido.  Discussed that currently she has dyspareunia and it is difficult to evaluate libido in the face of painful intimacy.  Will continue to treat GSM and if dyspareunia still persist we will do a more thorough evaluation.  Dyspareunia has resolved and still has decreased libido we will discuss other options for management at that time    4.  Dyspareunia  Most likely symptoms are secondary to GSM  Follow-up  The patient will follow up in 3 months.       Counseling:         Follow Up:  Return in about 3 months (around 6/24/2025).          Iraida Monsivais MD  03/24/2025    Patient or patient representative verbalized consent for the use of Ambient Listening during the visit with  Iraida Monsivais MD for chart documentation. 3/24/2025  12:38 EDT

## 2025-03-24 ENCOUNTER — OFFICE VISIT (OUTPATIENT)
Dept: OBSTETRICS AND GYNECOLOGY | Facility: CLINIC | Age: 55
End: 2025-03-24
Payer: COMMERCIAL

## 2025-03-24 VITALS
HEIGHT: 67 IN | BODY MASS INDEX: 22.6 KG/M2 | HEART RATE: 86 BPM | DIASTOLIC BLOOD PRESSURE: 72 MMHG | SYSTOLIC BLOOD PRESSURE: 115 MMHG | WEIGHT: 144 LBS

## 2025-03-24 DIAGNOSIS — R68.82 DECREASED LIBIDO: ICD-10-CM

## 2025-03-24 DIAGNOSIS — N94.10 FEMALE DYSPAREUNIA: ICD-10-CM

## 2025-03-24 DIAGNOSIS — N95.8 GENITOURINARY SYNDROME OF MENOPAUSE: ICD-10-CM

## 2025-03-24 DIAGNOSIS — N95.1 MENOPAUSAL SYMPTOMS: Primary | ICD-10-CM

## 2025-03-24 PROCEDURE — 99214 OFFICE O/P EST MOD 30 MIN: CPT | Performed by: OBSTETRICS & GYNECOLOGY

## 2025-03-24 RX ORDER — ESTRADIOL 0.07 MG/D
1 FILM, EXTENDED RELEASE TRANSDERMAL 2 TIMES WEEKLY
Qty: 24 PATCH | Refills: 3 | Status: SHIPPED | OUTPATIENT
Start: 2025-03-24

## 2025-05-28 RX ORDER — LEVOTHYROXINE SODIUM 75 UG/1
75 TABLET ORAL DAILY
Qty: 90 TABLET | Refills: 0 | Status: SHIPPED | OUTPATIENT
Start: 2025-05-28

## 2025-07-10 NOTE — PROGRESS NOTES
GYN Visit    CC: menopausal symptoms    HPI:   54 y.o. Contraception or HRT: Post menopausal, using HRT, s/p HYST, still has one or both ovaries, benign indications    History of Present Illness  The patient presents for evaluation of menopausal symptoms.    She reports experiencing breast tenderness, which has improved but persists. Over the past 2 to 3 weeks, she has noticed an increase in hair loss, which she attributes to her thyroid condition. Additionally, she mentions experiencing rectal pain and fatigue, feeling the need to sleep by early afternoon. These symptoms were not present when she was on a lower dose of medication. She has discontinued the use of estrogen cream, which she was applying twice weekly, due to a lack of desire for physical contact. She has abstained from sexual activity for approximately a month and is curious about the potential health implications of a decreased sex drive. She is uncertain about the date of her last annual exam and acknowledges that her colonoscopy is overdue.    GYNECOLOGICAL HISTORY:  Gynecology History discussed    OBSTETRICAL HISTORY:  Obstetrics History discussed    PAST MEDICAL HISTORY:  Thyroid condition    SEXUAL HISTORY:  Abstained from sexual activity for approximately a month     History: PMHx, Meds, Allergies, PSHx, Social Hx, and POBHx all reviewed and updated.  Physical Exam   PHYSICAL EXAM:  /81   Pulse 76   Wt 63 kg (139 lb)   Breastfeeding No   BMI 21.77 kg/m²   General- NAD, alert and oriented, appropriate  Psych- Normal mood, good memory      ASSESSMENT AND PLAN:  Diagnoses and all orders for this visit:    1. Menopausal symptoms (Primary)  -     estradiol (Minivelle) 0.05 MG/24HR patch; Place 1 patch on the skin as directed by provider 2 (Two) Times a Week.  Dispense: 24 patch; Refill: 3    2. Encounter for screening mammogram for malignant neoplasm of breast  -     Mammo Screening Digital Tomosynthesis Bilateral With CAD;  Future        Assessment & Plan  1. Menopausal symptoms.  - Symptoms include breast tenderness, hair loss, and fatigue. Rectal pain is not believed to be associated with menopause or hormonal changes.  - A decrease in estrogen patch dosage is recommended. Resuming vaginal estrogen cream for bladder symptoms is advised.     2.  Decreased libido   testosterone therapy was discussed as a potential treatment for decreased libido, with the goal of achieving premenopausal physiologic doses of testosterone. Risks and benefits of testosterone therapy were thoroughly explained. Alternative medications such as Addyi and Vyleesi were also discussed. A mammogram order has been placed. Consultation with Dr. Pryor regarding overdue colonoscopy is recommended.  .    Follow-up: The patient will follow up for her annual exam.           Follow Up:  Return for Annual physical.          Iraida Monsivais MD  07/16/2025    Patient or patient representative verbalized consent for the use of Ambient Listening during the visit with  Iraida Monsivais MD for chart documentation. 7/16/2025  11:59 EDT

## 2025-07-16 ENCOUNTER — OFFICE VISIT (OUTPATIENT)
Dept: OBSTETRICS AND GYNECOLOGY | Age: 55
End: 2025-07-16
Payer: COMMERCIAL

## 2025-07-16 VITALS
BODY MASS INDEX: 21.77 KG/M2 | HEART RATE: 76 BPM | SYSTOLIC BLOOD PRESSURE: 134 MMHG | WEIGHT: 139 LBS | DIASTOLIC BLOOD PRESSURE: 81 MMHG

## 2025-07-16 DIAGNOSIS — N95.1 MENOPAUSAL SYMPTOMS: Primary | ICD-10-CM

## 2025-07-16 DIAGNOSIS — Z12.31 ENCOUNTER FOR SCREENING MAMMOGRAM FOR MALIGNANT NEOPLASM OF BREAST: ICD-10-CM

## 2025-07-16 RX ORDER — ESTRADIOL 0.05 MG/D
1 PATCH, EXTENDED RELEASE TRANSDERMAL 2 TIMES WEEKLY
Qty: 24 PATCH | Refills: 3 | Status: SHIPPED | OUTPATIENT
Start: 2025-07-17

## 2025-08-27 RX ORDER — LEVOTHYROXINE SODIUM 75 UG/1
75 TABLET ORAL DAILY
Qty: 90 TABLET | Refills: 0 | Status: SHIPPED | OUTPATIENT
Start: 2025-08-27